# Patient Record
Sex: FEMALE | Race: WHITE | NOT HISPANIC OR LATINO | Employment: FULL TIME | URBAN - METROPOLITAN AREA
[De-identification: names, ages, dates, MRNs, and addresses within clinical notes are randomized per-mention and may not be internally consistent; named-entity substitution may affect disease eponyms.]

---

## 2019-02-04 ENCOUNTER — HOSPITAL ENCOUNTER (OUTPATIENT)
Dept: PHYSICAL THERAPY | Facility: HOSPITAL | Age: 39
Setting detail: THERAPIES SERIES
Discharge: HOME | End: 2019-02-04

## 2019-02-04 DIAGNOSIS — G89.29 CHRONIC BILATERAL LOW BACK PAIN WITHOUT SCIATICA: Primary | ICD-10-CM

## 2019-02-04 DIAGNOSIS — M54.50 CHRONIC BILATERAL LOW BACK PAIN WITHOUT SCIATICA: Primary | ICD-10-CM

## 2019-02-04 PROCEDURE — 97110 THERAPEUTIC EXERCISES: CPT

## 2019-02-04 PROCEDURE — 97140 MANUAL THERAPY 1/> REGIONS: CPT

## 2019-02-04 PROCEDURE — 97161 PT EVAL LOW COMPLEX 20 MIN: CPT

## 2019-02-04 PROCEDURE — 97035 APP MDLTY 1+ULTRASOUND EA 15: CPT

## 2019-02-04 NOTE — THERAPY EVALUATION
Outpatient Physical Therapy Ortho Initial Evaluation   [place]     Patient Name: Dank URIBE   : 1980  MRN: [id number]  Today's Date: 2019      Visit Date: 2019    There is no problem list on file for this patient.       No past medical history on file.     No past surgical history on file.    Visit Dx:     ICD-10-CM ICD-9-CM   1. Chronic bilateral low back pain without sciatica M54.5 724.2    G89.29 338.29       Patient History     Row Name 19 1000             History    Chief Complaint  Difficulty Walking;Difficulty with daily activities;Muscle tenderness;Pain  -CC      Type of Pain  Back pain  -CC      Date Current Problem(s) Began  19  -      Brief Description of Current Complaint  Pt reports a chronic hx of LBP for 13 years but pt had an injury aprox 18 which flared back and L hip sx. Pt is a chronic sleep walker and was staying at a friend and went out a window and fell 3 feet. 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  Dr Terri Garrett  -      How has patient tried to help current problem?  use of biofreeze and Tylenol  -CC      What clinical tests have you had for this problem?  -- none- scheduled for MRI 19  -         Pain     Pain Location  Back;Hip  -CC      Pain at Present  3  -CC      Pain at Best  3  -CC      Pain at Worst  4  -CC      Pain Frequency  Constant/continuous  -CC      Pain Description  Aching  -CC      What Performance Factors Make the Current Problem(s) WORSE?   bending-getting out of bed in AM  -CC      What Performance Factors Make the Current Problem(s) BETTER?  avoiding lifting -bending  -CC      Tolerance Time- Standing  15-30 min  -CC      Tolerance Time- Sitting  60 min  -CC      Tolerance Time- Walking  60 min-feel best walking  -CC      Is your sleep disturbed?  -- has difficulty getting comfortable but sleeps thru nite  -CC      Is medication used to assist with sleep?  -- Tylenol  -CC      What position do you sleep in?  Right sidelying  -CC      Difficulties at work?  yes with lifting with patient care  -CC      Difficulties with ADL's?  vacuuming and mopping-standing to fold laundry  -CC      Difficulties with recreational activities?  has not participated since injury  -CC         Fall Risk Assessment    Any falls in the past year:  Yes  -CC      Number of falls reported in the last 12 months  1  -CC      Factors that contributed to the fall:  -- sleep walking  -CC         Daily Activities    Primary Language  English  -CC      Are you able to read  Yes  -CC      Are you able to write  Yes  -CC      How does patient learn best?  Listening  -CC      Teaching needs identified  Home Exercise Program  -CC      Patient is concerned about/has problems with  Performing home management (household chores, shopping, care of dependents);Performing job responsibilities/community activities (work, school,;Performing sports, recreation, and play activities  -CC      Does patient have problems with the following?  Depression;Anxiety  -CC      Barriers to learning  None  -CC      Pt Participated in POC and Goals  Yes  -CC         Safety    Are you being hurt, hit, or frightened by anyone at home or in your life?  No  -CC      Are you being neglected by a caregiver  No  -CC        User Key  (r) = Recorded By, (t) = Taken By, (c) = Cosigned By    Initials Name Provider Type    CC Jenn Banda, PT Physical Therapist          PT Ortho     Row Name 02/04/19 1000        Subjective Comments    Subjective Comments  Pt states she continues to do what she has to do and then takes Tylenol and/or biofreeze. Pt is scheduled for a sleep study due to consequence of recent injury with sleep walking. -CC       Precautions and Contraindications    Precautions/Limitations  no known precautions/limitations  -CC       Posture/Observations    Scoliosis  Mild  -CC    Lumbar lordosis  Moderate;Decreased  -CC    Iliac crests  Left:;Mild;Elevated  -CC       Neural Tension Signs- Lower Quarter Clearing    Slump  Bilateral:;Negative  -CC    Well Slump  Bilateral:;Negative  -CC    SLR  Bilateral:;Negative  -CC       Lumbar ROM Screen- Lower Quarter Clearing    Lumbar Flexion  Impaired 50 % with sx  -CC    Lumbar Extension  Impaired 80 % with sx  -CC    Lumbar Lateral Flexion  Impaired SBL 50 %/ SBR 75 % w/ sx  -CC    Lumbar Rotation  Impaired 50% with sx  -CC       Lumbar/SI Special Tests    Standing Flexion Test (SI Dysfunction)  Left:;Positive  -CC    CHANDLER (hip vs. SI Dysfunction)  Bilateral:;Negative  -CC       Lumbosacral Palpation    SI  Left:;Tender  -CC    Lumbosacral Segment  Bilateral:;Tender  -CC    Spinous Process  -- negative  -CC    Piriformis  Left:;Tender  -CC    Erector Spinae (Paraspinals)  Left:;Tender  -CC    Greater Tuberosity  Left:;Tender  -CC       Hip Special Tests    Loretta’s test (tightness of ITB)  Left:;Positive  -CC       General ROM    GENERAL ROM COMMENTS  Grossly WNL B LE  -CC       MMT (Manual Muscle Testing)    General MMT Comments  Grossly B LE 5-/5 to 5/5  -CC       Sensation    Sensation WNL?  WNL  -CC       Lower Extremity Flexibility    Hamstrings  Right:;WNL L to 70% and mild sx back   -CC       Gait/Stairs Assessment/Training    Comment (Gait/Stairs)  No deficits in gait noted  -CC      User Key  (r) = Recorded By, (t) = Taken By, (c) = Cosigned By    Initials Name Provider Type    CC Jenn Banda, PT Physical Therapist                      Therapy  Education  Education Details: Issued HEP handout   Given: HEP  Program: New  How Provided: Verbal, Demonstration, Written  Provided to: Patient  Level of Understanding: Verbalized, Demonstrated     PT OP Goals     Row Name 02/04/19 1000          PT Short Term Goals    STG Date to Achieve  03/04/19  -CC     STG 1  Pt will perform HEP on daily basis as instructed  -CC     STG 2  Pt will report decrease sx 1-2/10 rangein L-S spine  -CC     STG 3  Pt will show improved spinal ROM in extension by 25-50%  -CC     STG 4  Pt reports decrease sx with vacuuming/mopping at home by 25-50%  -CC     STG 5  Pt will present with decrease soft tissue tenderness L SI jt and L greater trochanter by 25-50%  -CC       User Key  (r) = Recorded By, (t) = Taken By, (c) = Cosigned By    Initials Name Provider Type    CC Jenn Banda, PT Physical Therapist          PT Assessment/Plan     Row Name 02/04/19 1118          PT Assessment    Functional Limitations  Limitation in home management;Performance in work activities;Performance in leisure activities;Limitations in functional capacity and performance  -CC     Impairments  Impaired flexibility;Pain;Impaired postural alignment;Posture;Range of motion  -CC     Assessment Comments  Pt with 13 year history of chronic back pain but pt referred to PT secondary to a fall occuring the week before Christmas 2019 with persist flared sx. Pt presents with decrease spinal ROM greatest in extension -asymetry in pelvic and L sacral alignment and soft tissue dysfunction in  L L-S - L SI and L greater trochanter.  B LE strength normal and no neural signs identified.  Pt has not had any PT therapy since onset of sx 13 years ago.                                                                             -CC     Please refer to paper survey for additional self-reported information  Yes Back Index- score 44  -CC     Rehab Potential  Good  -CC     Patient/caregiver participated in establishment of  treatment plan and goals  Yes  -CC     Patient would benefit from skilled therapy intervention  Yes  -CC        PT Plan    PT Frequency  2x/week  -CC     Predicted Duration of Therapy Intervention (Therapy Eval)  4 weeks  -CC     Planned CPT's?  PT EVAL LOW COMPLEXITY: 59083;PT MANUAL THERAPY EA 15 MIN: 43907;PT HOT OR COLD PACK TREAT MCARE;PT ULTRASOUND EA 15 MIN: 82902;PT THER PROC EA 15 MIN: 23879;PT ELECTRICAL STIM UNATTEND: ;PT THER ACT EA 15 MIN: 31361  -CC     Physical Therapy Interventions (Optional Details)  taping;ROM (Range of Motion);strengthening;stretching;modalities;manual therapy techniques  -CC     PT Plan Comments  Per POC. Pt to have MRI at [facility] 2-11-19  -CC       User Key  (r) = Recorded By, (t) = Taken By, (c) = Cosigned By    Initials Name Provider Type    CC Jenn Banda, PT Physical Therapist          Modalities     Row Name 02/04/19 1000             Moist Heat    MH Applied  Yes  -CC      Location  L-S and L hip-pt sidelying  -CC      Rx Minutes  12 mins  -CC      MH S/P Rx  Yes  -CC         Ultrasound 44204    Location  L L-S/SI-pt sidelying R  -CC      Frequency  1.0 MHz  -CC      Intensity - Wts/cm  1.5  -CC      11396 - PT Ultrasound Minutes  8  -CC        User Key  (r) = Recorded By, (t) = Taken By, (c) = Cosigned By    Initials Name Provider Type    CC Jenn Banda, PT Physical Therapist        Exercises     Row Name 02/04/19 1000             Subjective Comments    Subjective Comments  Pt states she continues to do what she has to do and then takes Tylenol and/or biofreeze. Pt is scheduled for a sleep study due to sleep walking and most recent injury  -CC         Exercise 1    Exercise Name 1  B SKC  -CC      Cueing 1  Verbal;Tactile  -CC      Reps 1  x5  -CC      Time 1  10 sec  -CC         Exercise 2    Exercise Name 2  B LTR  -CC      Cueing 2  Verbal;Tactile  -CC      Reps 2  x5  -CC      Time 2  10 sec  -CC         Exercise 3     Exercise Name 3  B piriformis stretch  -CC      Cueing 3  Verbal;Tactile  -CC      Reps 3  x 3  -CC      Time 3  15 sec  -CC        User Key  (r) = Recorded By, (t) = Taken By, (c) = Cosigned By    Initials Name Provider Type    Jenn Salter, PT Physical Therapist           Manual Rx (last 36 hours)      Manual Treatments     Row Name 02/04/19 1000             Manual Rx 1    Manual Rx 1 Location  pelvis and sacrum  -CC      Manual Rx 1 Type  MET  -CC      Manual Rx 1 Grade  shotgun- Post rot L- LOL  -CC        User Key  (r) = Recorded By, (t) = Taken By, (c) = Cosigned By    Initials Name Provider Type    Jenn Salter, PT Physical Therapist                                Time Calculation:     Therapy Suggested Charges     Code   Minutes Charges    09143 (CPT®) Hc Pt Neuromusc Re Education Ea 15 Min      60861 (CPT®) Hc Pt Ther Proc Ea 15 Min      84068 (CPT®) Hc Gait Training Ea 15 Min      31421 (CPT®) Hc Pt Therapeutic Act Ea 15 Min      66119 (CPT®) Hc Pt Manual Therapy Ea 15 Min      16257 (CPT®) Hc Pt Ther Massage- Per 15 Min      10273 (CPT®) Hc Pt Iontophoresis Ea 15 Min      73349 (CPT®) Hc Pt Elec Stim Ea-Per 15 Min      56381 (CPT®) Hc Pt Ultrasound Ea 15 Min 8 1    04694 (CPT®) Hc Pt Self Care/Mgmt/Train Ea 15 Min      61385 (CPT®) Hc Pt Prosthetic (S) Train Initial Encounter, Each 15 Min      43114 (CPT®) Hc Orthotic(S) Mgmt/Train Initial Encounter, Each 15min      35841 (CPT®) Hc Pt Aquatic Therapy Ea 15 Min      10406 (CPT®) Hc Pt Orthotic(S)/Prosthetic(S) Encounter, Each 15 Min       (CPT®) Hc Pt Electrical Stim Unattended      Total  8 1          Start Time: 1000  Stop Time: 1115  Time Calculation (min): 75 min     Therapy Charges for Today     Code Description Service Date Service Provider Modifiers Qty    89079806514 HC PT EVAL LOW COMPLEXITY 2 2/4/2019 Jenn Banda, PT GP 1    88151684712 HC PT ULTRASOUND EA 15 MIN 2/4/2019 Jenn Badna,  PT GP 1    43414509791 HC PT THER PROC EA 15 MIN 2/4/2019 Jenn Banda, PT GP 1    45111642080 HC PT MANUAL THERAPY EA 15 MIN 2/4/2019 Jenn Banda, PT GP 1    49425579055 HC PT HOT OR COLD PACK TREAT MCARE 2/4/2019 Jenn Banda, PT GP 1                    Jenn Banda, PT  2/4/2019

## 2019-02-12 ENCOUNTER — HOSPITAL ENCOUNTER (OUTPATIENT)
Dept: PHYSICAL THERAPY | Facility: HOSPITAL | Age: 39
Setting detail: THERAPIES SERIES
Discharge: HOME | End: 2019-02-12

## 2019-02-12 DIAGNOSIS — G89.29 CHRONIC BILATERAL LOW BACK PAIN WITHOUT SCIATICA: Primary | ICD-10-CM

## 2019-02-12 DIAGNOSIS — M54.50 CHRONIC BILATERAL LOW BACK PAIN WITHOUT SCIATICA: Primary | ICD-10-CM

## 2019-02-12 PROCEDURE — 97110 THERAPEUTIC EXERCISES: CPT

## 2019-02-12 PROCEDURE — 97035 APP MDLTY 1+ULTRASOUND EA 15: CPT

## 2019-02-12 PROCEDURE — 97140 MANUAL THERAPY 1/> REGIONS: CPT

## 2019-02-12 NOTE — THERAPY TREATMENT NOTE
Outpatient Physical Therapy Ortho Treatment Note  [facility]     Patient Name: Dank URIBE   : 1980  MRN: [id number]  Today's Date: 2019      Visit Date: 2019    Visit Dx:    ICD-10-CM ICD-9-CM   1. Chronic bilateral low back pain without sciatica M54.5 724.2    G89.29 338.29       There is no problem list on file for this patient.       No past medical history on file.     No past surgical history on file.                    PT Assessment/Plan     Row Name 19 1629          PT Assessment    Assessment Comments  Pt reporting decrease intensity of back pain sx -also improving with alignment. Progressed exercises including lumbar stabilization.  -CC       User Key  (r) = Recorded By, (t) = Taken By, (c) = Cosigned By    Initials Name Provider Type    Jenn Salter, PT Physical Therapist          Modalities     Row Name 19 1100             Subjective Comments    Subjective Comments  Pt states she was sore the day of therapy and had a muscle spasm L lateral lumbar -pt had a friend massage it and felt better. Pt reports no sx in lumbar -relates pain L SI region  -CC         Subjective Pain    Pre-Treatment Pain Level  2  -CC      Post-Treatment Pain Level  1  -CC         Moist Heat    Location  L-S and pt sidelying  -CC      Rx Minutes  12 mins  -CC      MH S/P Rx  Yes  -CC         Ultrasound 20608    Location  L L-S/SI-pt sidelying R  -CC      Frequency  1.0 MHz  -CC      Intensity - Wts/cm  1.5  -CC      10103 - PT Ultrasound Minutes  8  -CC        User Key  (r) = Recorded By, (t) = Taken By, (c) = Cosigned By    Initials Name Provider Type    Jenn Salter, PT Physical Therapist          Exercises     Row Name 19 1100             Subjective Comments    Subjective Comments  Pt states she was sore the day of therapy and had a muscle spasm L lateral lumbar -pt had a friend massage it and felt better. Pt reports no sx in lumbar -relates pain L SI  region  -CC         Subjective Pain    Pre-Treatment Pain Level  2  -CC      Post-Treatment Pain Level  1  -CC         Exercise 1    Exercise Name 1  B DKC  -CC      Cueing 1  Verbal;Tactile  -CC      Reps 1  x5  -CC      Time 1  10 sec  -CC         Exercise 2    Exercise Name 2  B LTR  -CC      Cueing 2  Verbal;Tactile  -CC      Reps 2  x5  -CC      Time 2  10 sec  -CC         Exercise 3    Exercise Name 3  B piriformis stretch  -CC      Cueing 3  Verbal;Tactile  -CC      Reps 3  x 3  -CC      Time 3  15 sec  -CC         Exercise 4    Exercise Name 4  Prone on elbows  -CC      Cueing 4  Verbal;Tactile  -CC      Reps 4  x10-over 1 pillow  -CC         Exercise 5    Exercise Name 5  B PLR  -CC      Cueing 5  Verbal;Tactile  -CC      Reps 5  x5 -alternate legs- perform abs stability each lift  -CC         Exercise 6    Exercise Name 6  Lower AB march with PPT  -CC      Cueing 6  Verbal;Tactile  -CC      Reps 6  x5 -alternate  -CC        User Key  (r) = Recorded By, (t) = Taken By, (c) = Cosigned By    Initials Name Provider Type    Jenn Salter, PT Physical Therapist                        Manual Rx (last 36 hours)      Manual Treatments     Row Name 02/12/19 1100             Manual Rx 1    Manual Rx 1 Location  pelvis and sacrum  -CC      Manual Rx 1 Type  MET  -CC      Manual Rx 1 Grade  shotgun- Post rot L- ROL-FSRL L 5  -CC        User Key  (r) = Recorded By, (t) = Taken By, (c) = Cosigned By    Initials Name Provider Type    Jenn Salter, PT Physical Therapist          PT OP Goals     Row Name 02/12/19 1100          PT Short Term Goals    STG Date to Achieve  03/04/19  -CC     STG 1  Pt will perform HEP on daily basis as instructed  -CC     STG 2  Pt will report decrease sx 1-2/10 rangein L-S spine  -CC     STG 3  Pt will show improved spinal ROM in extension by 25-50%  -CC     STG 4  Pt reports decrease sx with vacuuming/mopping at home by 25-50%  -CC     STG 5  Pt will present with  decrease soft tissue tenderness L SI jt and L greater trochanter by 25-50%  -CANDE       User Key  (r) = Recorded By, (t) = Taken By, (c) = Cosigned By    Initials Name Provider Type    Jenn Salter, PT Physical Therapist          Therapy Education  Education Details: Issued handout for added exercises HEP  Given: HEP  Program: New, Reinforced, Progressed  How Provided: Verbal, Written  Provided to: Patient  Level of Understanding: Verbalized, Demonstrated              Time Calculation:   Start Time: 1100  Stop Time: 1200  Time Calculation (min): 60 min  Therapy Suggested Charges     Code   Minutes Charges    55313 (CPT®) Hc Pt Neuromusc Re Education Ea 15 Min      19306 (CPT®) Hc Pt Ther Proc Ea 15 Min      73354 (CPT®) Hc Gait Training Ea 15 Min      37433 (CPT®) Hc Pt Therapeutic Act Ea 15 Min      96262 (CPT®) Hc Pt Manual Therapy Ea 15 Min      12444 (CPT®) Hc Pt Ther Massage- Per 15 Min      85510 (CPT®) Hc Pt Iontophoresis Ea 15 Min      72311 (CPT®) Hc Pt Elec Stim Ea-Per 15 Min      40784 (CPT®) Hc Pt Ultrasound Ea 15 Min 8 1    84842 (CPT®) Hc Pt Self Care/Mgmt/Train Ea 15 Min      35975 (CPT®) Hc Pt Prosthetic (S) Train Initial Encounter, Each 15 Min      71292 (CPT®) Hc Orthotic(S) Mgmt/Train Initial Encounter, Each 15min      74073 (CPT®) Hc Pt Aquatic Therapy Ea 15 Min      46304 (CPT®) Hc Pt Orthotic(S)/Prosthetic(S) Encounter, Each 15 Min       (CPT®) Hc Pt Electrical Stim Unattended      Total  8 1        Therapy Charges for Today     Code Description Service Date Service Provider Modifiers Qty    94962661271 HC PT MANUAL THERAPY EA 15 MIN 2/12/2019 Jenn Banda, PT GP 1    42245326919 HC PT ULTRASOUND EA 15 MIN 2/12/2019 Jenn Banda, PT GP 1    01085889289 HC PT THER PROC EA 15 MIN 2/12/2019 Jenn Banda, PT GP 1    04253027050 HC PT HOT OR COLD PACK TREAT MCARE 2/12/2019 Jenn Banda, PT GP 1                    Jenn MIDDLETON  Solo, PT  2/12/2019

## 2019-02-19 ENCOUNTER — OFFICE VISIT (OUTPATIENT)
Dept: SLEEP MEDICINE | Facility: HOSPITAL | Age: 39
End: 2019-02-19
Attending: INTERNAL MEDICINE

## 2019-02-19 VITALS
SYSTOLIC BLOOD PRESSURE: 126 MMHG | DIASTOLIC BLOOD PRESSURE: 80 MMHG | HEART RATE: 102 BPM | HEIGHT: 69 IN | WEIGHT: 192 LBS | BODY MASS INDEX: 28.44 KG/M2

## 2019-02-19 DIAGNOSIS — G47.33 OBSTRUCTIVE SLEEP APNEA, ADULT: Primary | ICD-10-CM

## 2019-02-19 DIAGNOSIS — G47.52 REM SLEEP BEHAVIOR DISORDER: ICD-10-CM

## 2019-02-19 DIAGNOSIS — I10 ESSENTIAL HYPERTENSION: ICD-10-CM

## 2019-02-19 PROCEDURE — G0463 HOSPITAL OUTPT CLINIC VISIT: HCPCS

## 2019-02-19 NOTE — PROGRESS NOTES
PULMONARY SLEEP CONSULT NOTE      PATIENT IDENTIFICATION:  Name: Dank URIBE   Age: 38 y.o.  Sex: female  :  1980  MRN: [id number]    DATE OF CONSULTATION:  2019   Referring Physician: No admitting provider for patient encounter.                  CHIEF COMPLAINT: Obstructive Sleep Apnea    History of Present Illness:   Dank URIBE  is a 38 y.o. female Pt with still multiple wakening up at night with sleepiness fatigue and snoring, witnessed apnea, Hard  to get up in the morning. Daytime fatigue sleepiness loss of energy, [place] score of ( 16)   Also sleep walking recently    Review of Systems:   Constitutional: negative   Eyes: negative   ENT/oropharynx: negative   Cardiovascular: negative   Respiratory: negative   Gastrointestinal: negative   Genitourinary: negative   Neurological: negative   Musculoskeletal: negative   Integument/breast: negative   Endocrine: negative   Allergic/Immunologic: negative     Past Medical History:  No past medical history on file.    Past Surgical History:  No past surgical history on file.     Family History:  No family history on file.     Social History:   Social History     Tobacco Use   • Smoking status: Not on file   Substance Use Topics   • Alcohol use: Not on file        Allergies:  Allergies not on file    Home Meds:    (Not in a hospital admission)    Objective:    Vitals Ranges:   Heart Rate:  [102] 102  BP: (126)/(80) 126/80  Body mass index is 28.35 kg/m².     Exam:  General Appearance:  WDWN    HEENT:   without obvious abnormality,  Conjunctiva/corneas clear,  Normal external ear canals, no drainage    Clear orsalmucosa,  Mallampati score 3    Neck:  Supple, symmetrical, trachea midline. No JVD.  Lungs:   Bilateral basal rhonchi bilaterally, respirations unlabored symmetrical wall movement.    Chest wall:  No tenderness or deformity.    Heart:  Regular rate and rhythm, S1 and S2 normal.  Extremities: Trace edema no clubbing or Cyanosis        Data  Review:  All labs (24hrs): No results found for this or any previous visit (from the past 24 hour(s)).     Imaging:  [unfilled]    ASSESSMENT:  Diagnoses and all orders for this visit:    Obstructive sleep apnea, adult    REM sleep behavior disorder    Essential hypertension        PLAN:   This is patient with symptoms of obstructive sleep apnea, NPSG study ASAP / split night study, Avoid supine avoid sedative meds in pm, weight loss, Avoid driving. Long discussion with patient about the physiology of SANAZ, and long term and short term   benefit of treating SANAZ     Safety measurement for sleep walking     Zakiya Molina MD. D, ABSM.  2/19/2019  2:42 PM

## 2019-03-04 ENCOUNTER — HOSPITAL ENCOUNTER (OUTPATIENT)
Dept: SLEEP MEDICINE | Facility: HOSPITAL | Age: 39
Discharge: HOME | End: 2019-03-04
Admitting: INTERNAL MEDICINE

## 2019-03-04 DIAGNOSIS — G47.33 OBSTRUCTIVE SLEEP APNEA, ADULT: ICD-10-CM

## 2019-03-04 PROCEDURE — 95810 POLYSOM 6/> YRS 4/> PARAM: CPT

## 2019-03-06 ENCOUNTER — TELEPHONE (OUTPATIENT)
Dept: SLEEP MEDICINE | Facility: HOSPITAL | Age: 39
End: 2019-03-06

## 2019-03-29 ENCOUNTER — DOCUMENTATION (OUTPATIENT)
Dept: PHYSICAL THERAPY | Facility: HOSPITAL | Age: 39
End: 2019-03-29

## 2019-03-29 DIAGNOSIS — G89.29 CHRONIC BILATERAL LOW BACK PAIN WITHOUT SCIATICA: Primary | ICD-10-CM

## 2019-03-29 DIAGNOSIS — M54.50 CHRONIC BILATERAL LOW BACK PAIN WITHOUT SCIATICA: Primary | ICD-10-CM

## 2019-04-16 ENCOUNTER — OFFICE VISIT (OUTPATIENT)
Dept: SLEEP MEDICINE | Facility: HOSPITAL | Age: 39
End: 2019-04-16

## 2019-04-16 VITALS
WEIGHT: 193 LBS | BODY MASS INDEX: 28.58 KG/M2 | SYSTOLIC BLOOD PRESSURE: 125 MMHG | HEIGHT: 69 IN | HEART RATE: 93 BPM | DIASTOLIC BLOOD PRESSURE: 76 MMHG

## 2019-04-16 DIAGNOSIS — I10 ESSENTIAL HYPERTENSION: ICD-10-CM

## 2019-04-16 DIAGNOSIS — G47.61 PERIODIC LIMB MOVEMENT DISORDER: ICD-10-CM

## 2019-04-16 DIAGNOSIS — R06.83 SNORING: Primary | ICD-10-CM

## 2019-04-16 PROCEDURE — G0463 HOSPITAL OUTPT CLINIC VISIT: HCPCS

## 2019-04-16 NOTE — PROGRESS NOTES
PULMONARY SLEEP CONSULT NOTE      PATIENT IDENTIFICATION:  Name: Dank URIBE   Age: 38 y.o.  Sex: female  :  1980  MRN: [id number]    DATE OF CONSULTATION:  2019   Referring Physician: No admitting provider for patient encounter.                  CHIEF COMPLAINT: Obstructive Sleep Apnea    History of Present Illness:   Dank URIBE  is a 38 y.o. female  had sleep study done on (3/4/2019 )  primary snoring but is showed PLMD, sleep result discussed in details with patient.       Review of Systems:   Constitutional: negative   Eyes: negative   ENT/oropharynx: negative   Cardiovascular: negative   Respiratory: negative   Gastrointestinal: negative   Genitourinary: negative   Neurological: negative   Musculoskeletal: negative   Integument/breast: negative   Endocrine: negative   Allergic/Immunologic: negative     Past Medical History:  No past medical history on file.    Past Surgical History:  No past surgical history on file.     Family History:  No family history on file.     Social History:   Social History     Tobacco Use   • Smoking status: Not on file   Substance Use Topics   • Alcohol use: Not on file        Allergies:  Allergies not on file    Home Meds:    (Not in a hospital admission)    Objective:    Vitals Ranges:   Heart Rate:  [93] 93  BP: (125)/(76) 125/76  Body mass index is 28.5 kg/m².     Exam:  General Appearance:  WDWN    HEENT:   without obvious abnormality,  Conjunctiva/corneas clear,  Normal external ear canals, no drainage    Clear orsalmucosa,  Mallampati score 3    Neck:  Supple, symmetrical, trachea midline. No JVD.  Lungs:   Bilateral basal rhonchi bilaterally, respirations unlabored symmetrical wall movement.    Chest wall:  No tenderness or deformity.    Heart:  Regular rate and rhythm, S1 and S2 normal.  Extremities: Trace edema no clubbing or Cyanosis        Data Review:  All labs (24hrs): No results found for this or any previous visit (from the past 24  hour(s)).     Imaging:  [unfilled]    ASSESSMENT:  Diagnoses and all orders for this visit:    Snoring    Periodic limb movement disorder    Essential hypertension        PLAN:   This is patient with snoring  Avoid supine avoid sedative meds in pm, weight loss, Avoid driving. Long discussion with patient about the physiology of SANAZ, and long term and short term   benefit of treating SANAZ     This patient with PLMD try mirapex at night and f/u as out pt 0.5 up to 1 mg if needed  Treating SANAZ will improve BP control         Zakiya Molina MD. D, ABSM.  4/16/2019  2:34 PM

## 2019-05-28 ENCOUNTER — OFFICE VISIT (OUTPATIENT)
Dept: SLEEP MEDICINE | Facility: HOSPITAL | Age: 39
End: 2019-05-28

## 2019-05-28 VITALS
BODY MASS INDEX: 27.85 KG/M2 | WEIGHT: 188 LBS | SYSTOLIC BLOOD PRESSURE: 143 MMHG | HEIGHT: 69 IN | DIASTOLIC BLOOD PRESSURE: 81 MMHG

## 2019-05-28 DIAGNOSIS — G47.52 REM SLEEP BEHAVIOR DISORDER: Primary | ICD-10-CM

## 2019-05-28 DIAGNOSIS — G47.61 PERIODIC LIMB MOVEMENT DISORDER: ICD-10-CM

## 2019-05-28 PROCEDURE — G0463 HOSPITAL OUTPT CLINIC VISIT: HCPCS

## 2019-05-28 NOTE — PROGRESS NOTES
PULMONARY SLEEP CONSULT NOTE      PATIENT IDENTIFICATION:  Name: Dank URIBE   Age: 38 y.o.  Sex: female  :  1980  MRN: [id number]    DATE OF CONSULTATION:  2019   Referring Physician: No admitting provider for patient encounter.                  CHIEF COMPLAINT: Obstructive Sleep Apnea    History of Present Illness:   Dank URIBE  is a 38 y.o. female pt on treatment for PLMD with mirapex feeling sleepy even with 0.5 mg  But able to  keep less moving legs and wakening up at night, and still  tiredness fatigue.      Review of Systems:   Constitutional: negative   Eyes: negative   ENT/oropharynx: negative   Cardiovascular: negative   Respiratory: negative   Gastrointestinal: negative   Genitourinary: negative   Neurological: negative   Musculoskeletal: negative   Integument/breast: negative   Endocrine: negative   Allergic/Immunologic: negative     Past Medical History:  No past medical history on file.    Past Surgical History:  No past surgical history on file.     Family History:  No family history on file.     Social History:   Social History     Tobacco Use   • Smoking status: Not on file   Substance Use Topics   • Alcohol use: Not on file        Allergies:  Allergies not on file    Home Meds:    (Not in a hospital admission)    Objective:    Vitals Ranges:   BP: (143)/(81) 143/81  Body mass index is 27.76 kg/m².     Exam:  General Appearance:  WDWN    HEENT:   without obvious abnormality,  Conjunctiva/corneas clear,  Normal external ear canals, no drainage    Clear orsalmucosa,  Mallampati score 3    Neck:  Supple, symmetrical, trachea midline. No JVD.  Lungs:   Bilateral basal rhonchi bilaterally, respirations unlabored symmetrical wall movement.    Chest wall:  No tenderness or deformity.    Heart:  Regular rate and rhythm, S1 and S2 normal.  Extremities: Trace edema no clubbing or Cyanosis        Data Review:  All labs (24hrs): No results found for this or any previous visit (from the  past 24 hour(s)).     Imaging:  [unfilled]    ASSESSMENT:  Diagnoses and all orders for this visit:    REM sleep behavior disorder    Periodic limb movement disorder        PLAN:  Will try gabapentin 100 mg  at night can increase to 200 mg which less daytime sleepiness, and will help to improve REM sleep      Zakiya Molina MD. D, ABSM.  5/28/2019  10:57 AM

## 2019-07-09 ENCOUNTER — OFFICE VISIT (OUTPATIENT)
Dept: SLEEP MEDICINE | Facility: HOSPITAL | Age: 39
End: 2019-07-09

## 2019-07-09 VITALS
HEIGHT: 69 IN | SYSTOLIC BLOOD PRESSURE: 127 MMHG | WEIGHT: 188 LBS | HEART RATE: 82 BPM | BODY MASS INDEX: 27.85 KG/M2 | DIASTOLIC BLOOD PRESSURE: 74 MMHG

## 2019-07-09 DIAGNOSIS — G47.52 REM SLEEP BEHAVIOR DISORDER: ICD-10-CM

## 2019-07-09 DIAGNOSIS — I10 ESSENTIAL HYPERTENSION: ICD-10-CM

## 2019-07-09 DIAGNOSIS — G47.61 PERIODIC LIMB MOVEMENT DISORDER: Primary | ICD-10-CM

## 2019-07-09 PROCEDURE — G0463 HOSPITAL OUTPT CLINIC VISIT: HCPCS

## 2019-07-09 NOTE — PROGRESS NOTES
PULMONARY SLEEP CONSULT NOTE      PATIENT IDENTIFICATION:  Name: Dank URIBE   Age: 38 y.o.  Sex: female  :  1980  MRN: [id number]    DATE OF CONSULTATION:  2019   Referring Physician: No admitting provider for patient encounter.                  CHIEF COMPLAINT: Obstructive Sleep Apnea    History of Present Illness:   Dank URIBE  is a 38 y.o. female pt tried Gabapentin 2 00 did not help she is back to mirapex and working good except hard to wake up in am only but the rest of the day feeling better full off energy      Review of Systems:   Constitutional: As above    Eyes: negative   ENT/oropharynx: negative   Cardiovascular: negative   Respiratory: negative   Gastrointestinal: negative   Genitourinary: negative   Neurological: negative   Musculoskeletal: negative   Integument/breast: negative   Endocrine: negative   Allergic/Immunologic: negative     Past Medical History:  No past medical history on file.    Past Surgical History:  No past surgical history on file.     Family History:  No family history on file.     Social History:   Social History     Tobacco Use   • Smoking status: Not on file   Substance Use Topics   • Alcohol use: Not on file        Allergies:  Allergies not on file    Home Meds:    (Not in a hospital admission)    Objective:    Vitals Ranges:   Heart Rate:  [82] 82  BP: (127)/(74) 127/74  Body mass index is 27.76 kg/m².     Exam:  General Appearance:  WDWN    HEENT:   without obvious abnormality,  Conjunctiva/corneas clear,  Normal external ear canals, no drainage    Clear orsalmucosa,  Mallampati score 3    Neck:  Supple, symmetrical, trachea midline. No JVD.  Lungs:   Bilateral basal rhonchi bilaterally, respirations unlabored symmetrical wall movement.    Chest wall:  No tenderness or deformity.    Heart:  Regular rate and rhythm, S1 and S2 normal.  Extremities: Trace edema no clubbing or Cyanosis        Data Review:  All labs (24hrs): No results found for this or any  previous visit (from the past 24 hour(s)).     Imaging:  [unfilled]    ASSESSMENT:  Diagnoses and all orders for this visit:    Periodic limb movement disorder    REM sleep behavior disorder    Essential hypertension        PLAN:  Feeling better on Mirapex  1 mg  continue monitor for development of anemia or renal failure  Can increase to 2 mg if needed, also to take it 3-4 hour before bed time   Still sleep talking but it is not affecting her social life    Zakiya Molina MD. D, ABSM.  7/9/2019  2:39 PM

## 2021-06-11 ENCOUNTER — TRANSCRIBE ORDERS (OUTPATIENT)
Dept: ADMINISTRATIVE | Facility: HOSPITAL | Age: 41
End: 2021-06-11

## 2021-06-11 DIAGNOSIS — M25.562 ACUTE PAIN OF LEFT KNEE: Primary | ICD-10-CM

## 2021-06-29 ENCOUNTER — HOSPITAL ENCOUNTER (OUTPATIENT)
Dept: MRI IMAGING | Facility: HOSPITAL | Age: 41
Discharge: HOME | End: 2021-06-29
Admitting: FAMILY MEDICINE

## 2021-06-29 DIAGNOSIS — M25.562 ACUTE PAIN OF LEFT KNEE: ICD-10-CM

## 2021-06-29 PROCEDURE — 73721 MRI JNT OF LWR EXTRE W/O DYE: CPT

## 2022-07-08 ENCOUNTER — PATIENT ROUNDING (BHMG ONLY) (OUTPATIENT)
Dept: FAMILY MEDICINE CLINIC | Facility: CLINIC | Age: 42
End: 2022-07-08

## 2022-07-08 ENCOUNTER — OFFICE VISIT (OUTPATIENT)
Dept: FAMILY MEDICINE CLINIC | Facility: CLINIC | Age: 42
End: 2022-07-08

## 2022-07-08 VITALS
WEIGHT: 214 LBS | TEMPERATURE: 98.7 F | HEIGHT: 69 IN | OXYGEN SATURATION: 98 % | HEART RATE: 76 BPM | BODY MASS INDEX: 31.7 KG/M2 | DIASTOLIC BLOOD PRESSURE: 91 MMHG | SYSTOLIC BLOOD PRESSURE: 134 MMHG

## 2022-07-08 DIAGNOSIS — Z00.00 ANNUAL PHYSICAL EXAM: Primary | ICD-10-CM

## 2022-07-08 DIAGNOSIS — Z13.1 SCREENING FOR DIABETES MELLITUS: ICD-10-CM

## 2022-07-08 DIAGNOSIS — Z87.19 HISTORY OF HIATAL HERNIA: ICD-10-CM

## 2022-07-08 DIAGNOSIS — Z71.6 ENCOUNTER FOR SMOKING CESSATION COUNSELING: ICD-10-CM

## 2022-07-08 DIAGNOSIS — Z13.29 SCREENING FOR THYROID DISORDER: ICD-10-CM

## 2022-07-08 DIAGNOSIS — Z76.89 ENCOUNTER TO ESTABLISH CARE: ICD-10-CM

## 2022-07-08 DIAGNOSIS — E78.2 MIXED HYPERLIPIDEMIA: ICD-10-CM

## 2022-07-08 DIAGNOSIS — K21.9 GASTROESOPHAGEAL REFLUX DISEASE, UNSPECIFIED WHETHER ESOPHAGITIS PRESENT: ICD-10-CM

## 2022-07-08 PROBLEM — G47.33 OBSTRUCTIVE SLEEP APNEA SYNDROME: Status: ACTIVE | Noted: 2022-07-08

## 2022-07-08 LAB — HBA1C MFR BLD: 5.1 % (ref 3.5–5.6)

## 2022-07-08 PROCEDURE — 83036 HEMOGLOBIN GLYCOSYLATED A1C: CPT | Performed by: REGISTERED NURSE

## 2022-07-08 PROCEDURE — 99204 OFFICE O/P NEW MOD 45 MIN: CPT | Performed by: REGISTERED NURSE

## 2022-07-08 PROCEDURE — 80050 GENERAL HEALTH PANEL: CPT | Performed by: REGISTERED NURSE

## 2022-07-08 PROCEDURE — 80061 LIPID PANEL: CPT | Performed by: REGISTERED NURSE

## 2022-07-08 RX ORDER — ATORVASTATIN CALCIUM 10 MG/1
10 TABLET, FILM COATED ORAL DAILY
Qty: 90 TABLET | Refills: 3 | Status: SHIPPED | OUTPATIENT
Start: 2022-07-08

## 2022-07-08 RX ORDER — OMEPRAZOLE 20 MG/1
20 CAPSULE, DELAYED RELEASE ORAL DAILY
COMMUNITY
End: 2022-07-08 | Stop reason: SDUPTHER

## 2022-07-08 RX ORDER — OMEPRAZOLE 40 MG/1
40 CAPSULE, DELAYED RELEASE ORAL DAILY
Qty: 90 CAPSULE | Refills: 1 | Status: SHIPPED | OUTPATIENT
Start: 2022-07-08 | End: 2023-02-17

## 2022-07-08 RX ORDER — OMEPRAZOLE 20 MG/1
20 CAPSULE, DELAYED RELEASE ORAL DAILY
Qty: 90 CAPSULE | Refills: 1 | Status: SHIPPED | OUTPATIENT
Start: 2022-07-08 | End: 2022-07-08

## 2022-07-08 RX ORDER — BUPROPION HYDROCHLORIDE 450 MG/1
450 TABLET, FILM COATED, EXTENDED RELEASE ORAL DAILY
COMMUNITY

## 2022-07-08 RX ORDER — POLYETHYLENE GLYCOL 3350 17 G
4 POWDER IN PACKET (EA) ORAL AS NEEDED
Qty: 108 EACH | Refills: 3 | Status: SHIPPED | OUTPATIENT
Start: 2022-07-08 | End: 2023-03-28

## 2022-07-08 RX ORDER — ATORVASTATIN CALCIUM 10 MG/1
10 TABLET, FILM COATED ORAL DAILY
COMMUNITY
End: 2022-07-08 | Stop reason: SDUPTHER

## 2022-07-08 NOTE — PROGRESS NOTES
July 8, 2022    Hello, may I speak with Dank Cevallos?    My name is Cari    I am  with [facility]  [address] [place] [address].    Before we get started may I verify your date of birth? 1980    I am calling to officially welcome you to our practice and ask about your recent visit. Is this a good time to talk? yes    Tell me about your visit with us. What things went well?  patients visit with Volodymyr went really good!       We're always looking for ways to make our patients' experiences even better. Do you have recommendations on ways we may improve?  yes    Overall were you satisfied with your first visit to our practice? yes       I appreciate you taking the time to speak with me today. Is there anything else I can do for you? no      Thank you, and have a great day.

## 2022-07-08 NOTE — PROGRESS NOTES
Chief Complaint  Establish Care    Subjective    History of Present Illness {CC  Problem List  Visit  Diagnosis   Encounters  Notes  Medications  Labs  Result Review Imaging  Media :23}     Dank Cevallos presents to [facility] for Establish Care.    Is a 41-year-old female who presents to the clinic today to establish care, with concerns of chronic GERD and smoking cessation.  Patient denies any chest pain, shortness of breath, or any fevers.  Patient denies any known exposure to COVID, flu, or any other contagious illnesses.    Regarding GERD, patient shares that she has been on omeprazole for several years.  Patient shares that she has a hiatal hernia.  She shares that she had an upper endoscopy for this approximately 12 years ago.  She shares that she has been taking omeprazole prior to this at the 20 mg dose.  We discussed increasing this dose.  Patient has requested GI referral today to establish and discuss having another upper endoscopy to evaluate any changes with her hiatal hernia.    Regarding smoking cessation, patient shares that she is actively trying to quit smoking.  She shares that she has been using the nicotine lozenges.  She would like to know if a prescription can be sent over to help cover these.  I have advised patient I be happy to send over information for the [place] quit now program.  Patient has no other concerns or questions about this at this time.         Review of Systems   Constitutional: Negative.  Negative for activity change, chills, fatigue and fever.   HENT: Negative.  Negative for congestion, dental problem, ear pain, hearing loss, rhinorrhea, sinus pain, sore throat, tinnitus and trouble swallowing.    Eyes: Negative.  Negative for pain and visual disturbance.   Respiratory: Negative.  Negative for cough, chest tightness, shortness of breath and wheezing.    Cardiovascular: Negative.  Negative for chest pain, palpitations and leg  "swelling.   Gastrointestinal: Negative.  Negative for abdominal pain, diarrhea, nausea and vomiting.        Heart burn   Endocrine: Negative.  Negative for polydipsia, polyphagia and polyuria.   Genitourinary: Negative.  Negative for difficulty urinating, dysuria, frequency and urgency.   Musculoskeletal: Negative.  Negative for arthralgias, back pain and myalgias.   Skin: Negative.  Negative for color change, pallor, rash and wound.   Allergic/Immunologic: Negative.  Negative for environmental allergies.   Neurological: Negative.  Negative for dizziness, speech difficulty, weakness, light-headedness, numbness and headaches.   Hematological: Negative.    Psychiatric/Behavioral: Negative.  Negative for confusion, decreased concentration, self-injury and suicidal ideas. The patient is not nervous/anxious.    All other systems reviewed and are negative.       Objective     Vital Signs:   /91 (BP Location: Right arm, Patient Position: Sitting, Cuff Size: Adult)   Pulse 76   Temp 98.7 °F (37.1 °C) (Temporal)   Ht 175.3 cm (69\")   Wt 97.1 kg (214 lb)   SpO2 98%   BMI 31.60 kg/m²     Past Medical History:   Diagnosis Date   • Anxiety    • Colon polyp    • Depression    • GERD (gastroesophageal reflux disease)    • Headache    • Heart murmur    • Hyperlipidemia    • Hypertension    • Irritable bowel syndrome    • Low back pain       Past Surgical History:   Procedure Laterality Date   • COLONOSCOPY     • ENDOMETRIAL ABLATION     • SUBTOTAL HYSTERECTOMY        Family History   Problem Relation Age of Onset   • Bipolar disorder Mother    • No Known Problems Father    • Irritable bowel syndrome Sister    • Fibromyalgia Sister    • No Known Problems Brother    • Hypertension Daughter       Social History     Socioeconomic History   • Marital status: Single   Tobacco Use   • Smoking status: Light Tobacco Smoker     Packs/day: 0.25     Years: 20.00     Pack years: 5.00     Types: Cigarettes   • Smokeless tobacco: " Never Used   Vaping Use   • Vaping Use: Never used   Substance and Sexual Activity   • Alcohol use: Yes   • Drug use: Never   • Sexual activity: Yes     Partners: Male         No visits with results within 3 Month(s) from this visit.   Latest known visit with results is:   No results found for any previous visit.         Physical Exam  Vitals and nursing note reviewed.   Constitutional:       Appearance: Normal appearance. She is normal weight.   HENT:      Head: Normocephalic and atraumatic.   Cardiovascular:      Rate and Rhythm: Normal rate and regular rhythm.      Pulses: Normal pulses.      Heart sounds: Normal heart sounds. No murmur heard.    No friction rub. No gallop.   Pulmonary:      Effort: Pulmonary effort is normal. No respiratory distress.      Breath sounds: Normal breath sounds. No stridor. No wheezing, rhonchi or rales.   Chest:      Chest wall: No tenderness.   Abdominal:      General: Abdomen is flat. Bowel sounds are normal. There is no distension.      Palpations: Abdomen is soft. There is no mass.      Tenderness: There is no abdominal tenderness. There is no right CVA tenderness, left CVA tenderness, guarding or rebound.      Hernia: No hernia is present.   Skin:     General: Skin is warm and dry.      Capillary Refill: Capillary refill takes less than 2 seconds.      Coloration: Skin is not jaundiced or pale.   Neurological:      General: No focal deficit present.      Mental Status: She is alert and oriented to person, place, and time. Mental status is at baseline.      Motor: No weakness.      Coordination: Coordination normal.      Gait: Gait normal.   Psychiatric:         Mood and Affect: Mood normal.         Behavior: Behavior normal.         Thought Content: Thought content normal.         Judgment: Judgment normal.          Result Review  Data Reviewed:{ Labs  Result Review  Imaging  Med Tab  Media :23}   I reviewed this patient's chart.  I reviewed previous labs, previous  imaging, previous medications, and previous encounters with notes.           Assessment and Plan {CC Problem List  Visit Diagnosis  ROS  Review (Popup)  [facility] Maintenance  Quality  BestPractice  Medications  SmartSets  SnapShot Encounters  Media :23}   Diagnoses and all orders for this visit:    1. Annual physical exam (Primary)    2. Gastroesophageal reflux disease, unspecified whether esophagitis present  -     Discontinue: omeprazole (priLOSEC) 20 MG capsule; Take 1 capsule by mouth Daily.  Dispense: 90 capsule; Refill: 1  -     Comprehensive Metabolic Panel  -     CBC & Differential    3. Mixed hyperlipidemia  -     atorvastatin (LIPITOR) 10 MG tablet; Take 1 tablet by mouth Daily.  Dispense: 90 tablet; Refill: 3  -     Lipid Panel  -     omeprazole (priLOSEC) 40 MG capsule; Take 1 capsule by mouth Daily.  Dispense: 90 capsule; Refill: 1    4. Encounter for smoking cessation counseling  -     nicotine polacrilex (Nicotine Mini) 4 MG lozenge; Dissolve 1 lozenge in the mouth As Needed for Smoking Cessation.  Dispense: 108 each; Refill: 3    5. Screening for thyroid disorder  -     TSH    6. Screening for diabetes mellitus  -     Hemoglobin A1c    7. Encounter to establish care    8. History of hiatal hernia  -     Ambulatory Referral to Gastroenterology      -Increase omeprazole to 40 mg daily.  -Referral to GI to further evaluate need for upper endoscopy at patient's request.  -Fasting labs at today's visit.  -Smoking cessation discussed with patient.  Medication sent.  Offered referral to [place]'s quit now program.  -Follow-up in 6 months for next routine visit.  Patient has shared that she will follow-up over the phone in the meantime if she needs anything or she is more than welcome to come back for another appointment.    I spent 45 minutes caring for Dank on this date of service. This time includes time spent by me in the following activities:preparing for the visit, reviewing tests, obtaining  and/or reviewing a separately obtained history, performing a medically appropriate examination and/or evaluation , counseling and educating the patient/family/caregiver, ordering medications, tests, or procedures, referring and communicating with other health care professionals , documenting information in the medical record, independently interpreting results and communicating that information with the patient/family/caregiver and care coordination.    Follow Up {Instructions Charge Capture  Follow-up Communications :23}     Patient was given instructions and counseling regarding her condition or for health maintenance advice. Please see specific information pulled into the AVS (placed there by myself) if appropriate.    Return in about 6 months (around 1/8/2023).    MDM  Number of Diagnoses or Management Options  Annual physical exam: new, needed workup  Encounter for smoking cessation counseling: new, needed workup  Encounter to establish care: new, needed workup  Gastroesophageal reflux disease, unspecified whether esophagitis present: established, worsening  History of hiatal hernia: established, worsening  Mixed hyperlipidemia: established, improving  Screening for diabetes mellitus: established, improving  Screening for thyroid disorder: established, improving     Amount and/or Complexity of Data Reviewed  Clinical lab tests: ordered and reviewed  Tests in the radiology section of CPT®: reviewed  Tests in the medicine section of CPT®: reviewed  Discussion of test results with the performing providers: no  Decide to obtain previous medical records or to obtain history from someone other than the patient: yes  Obtain history from someone other than the patient: no  Review and summarize past medical records: yes  Discuss the patient with other providers: no  Independent visualization of images, tracings, or specimens: no    Risk of Complications, Morbidity, and/or Mortality  Presenting problems:  high  Diagnostic procedures: low  Management options: high    Critical Care  Total time providing critical care: 30-74 minutes    Patient Progress  Patient progress: stable       KEENA Mccarthy, FNP-BC

## 2022-07-08 NOTE — PROGRESS NOTES
Venipuncture Blood Specimen Collection  Venipuncture performed in L arm by BREN PICHARDO MA with good hemostasis. Patient tolerated the procedure well without complications.   07/08/22   BREN PICHARDO MA

## 2022-07-09 LAB
ALBUMIN SERPL-MCNC: 3.9 G/DL (ref 3.5–5.2)
ALBUMIN/GLOB SERPL: 1.8 G/DL
ALP SERPL-CCNC: 79 U/L (ref 39–117)
ALT SERPL W P-5'-P-CCNC: 31 U/L (ref 1–33)
ANION GAP SERPL CALCULATED.3IONS-SCNC: 9.7 MMOL/L (ref 5–15)
AST SERPL-CCNC: 20 U/L (ref 1–32)
BASOPHILS # BLD AUTO: 0.03 10*3/MM3 (ref 0–0.2)
BASOPHILS NFR BLD AUTO: 0.4 % (ref 0–1.5)
BILIRUB SERPL-MCNC: 0.4 MG/DL (ref 0–1.2)
BUN SERPL-MCNC: 14 MG/DL (ref 6–20)
BUN/CREAT SERPL: 15.6 (ref 7–25)
CALCIUM SPEC-SCNC: 8.6 MG/DL (ref 8.6–10.5)
CHLORIDE SERPL-SCNC: 102 MMOL/L (ref 98–107)
CHOLEST SERPL-MCNC: 186 MG/DL (ref 0–200)
CO2 SERPL-SCNC: 25.3 MMOL/L (ref 22–29)
CREAT SERPL-MCNC: 0.9 MG/DL (ref 0.57–1)
DEPRECATED RDW RBC AUTO: 41.3 FL (ref 37–54)
EGFRCR SERPLBLD CKD-EPI 2021: 83.6 ML/MIN/1.73
EOSINOPHIL # BLD AUTO: 0.14 10*3/MM3 (ref 0–0.4)
EOSINOPHIL NFR BLD AUTO: 2.1 % (ref 0.3–6.2)
ERYTHROCYTE [DISTWIDTH] IN BLOOD BY AUTOMATED COUNT: 13 % (ref 12.3–15.4)
GLOBULIN UR ELPH-MCNC: 2.2 GM/DL
GLUCOSE SERPL-MCNC: 109 MG/DL (ref 65–99)
HCT VFR BLD AUTO: 44 % (ref 34–46.6)
HDLC SERPL-MCNC: 40 MG/DL (ref 40–60)
HGB BLD-MCNC: 15.6 G/DL (ref 12–15.9)
IMM GRANULOCYTES # BLD AUTO: 0.04 10*3/MM3 (ref 0–0.05)
IMM GRANULOCYTES NFR BLD AUTO: 0.6 % (ref 0–0.5)
LDLC SERPL CALC-MCNC: 97 MG/DL (ref 0–100)
LDLC/HDLC SERPL: 2.18 {RATIO}
LYMPHOCYTES # BLD AUTO: 2.02 10*3/MM3 (ref 0.7–3.1)
LYMPHOCYTES NFR BLD AUTO: 30 % (ref 19.6–45.3)
MCH RBC QN AUTO: 31.9 PG (ref 26.6–33)
MCHC RBC AUTO-ENTMCNC: 35.5 G/DL (ref 31.5–35.7)
MCV RBC AUTO: 90 FL (ref 79–97)
MONOCYTES # BLD AUTO: 0.5 10*3/MM3 (ref 0.1–0.9)
MONOCYTES NFR BLD AUTO: 7.4 % (ref 5–12)
NEUTROPHILS NFR BLD AUTO: 4.01 10*3/MM3 (ref 1.7–7)
NEUTROPHILS NFR BLD AUTO: 59.5 % (ref 42.7–76)
NRBC BLD AUTO-RTO: 0 /100 WBC (ref 0–0.2)
PLATELET # BLD AUTO: 229 10*3/MM3 (ref 140–450)
PMV BLD AUTO: 11.7 FL (ref 6–12)
POTASSIUM SERPL-SCNC: 3.9 MMOL/L (ref 3.5–5.2)
PROT SERPL-MCNC: 6.1 G/DL (ref 6–8.5)
RBC # BLD AUTO: 4.89 10*6/MM3 (ref 3.77–5.28)
SODIUM SERPL-SCNC: 137 MMOL/L (ref 136–145)
TRIGL SERPL-MCNC: 294 MG/DL (ref 0–150)
TSH SERPL DL<=0.05 MIU/L-ACNC: 1.92 UIU/ML (ref 0.27–4.2)
VLDLC SERPL-MCNC: 49 MG/DL (ref 5–40)
WBC NRBC COR # BLD: 6.74 10*3/MM3 (ref 3.4–10.8)

## 2022-08-12 ENCOUNTER — TELEMEDICINE (OUTPATIENT)
Dept: FAMILY MEDICINE CLINIC | Facility: CLINIC | Age: 42
End: 2022-08-12

## 2022-08-12 DIAGNOSIS — M79.661 RIGHT CALF PAIN: ICD-10-CM

## 2022-08-12 DIAGNOSIS — S89.91XA BLUNT TRAUMA OF RIGHT LOWER LEG, INITIAL ENCOUNTER: Primary | ICD-10-CM

## 2022-08-12 PROCEDURE — 99213 OFFICE O/P EST LOW 20 MIN: CPT | Performed by: REGISTERED NURSE

## 2022-08-12 NOTE — PROGRESS NOTES
[facility] PRIMARY CARE  Patient: Dank Cevallos   Age: 41 y.o.  Sex: female  :  1980  MRN: [id number]    Dank Cevallos was located at home and I was located at my office for this telemedicine/telephone encounter. We utilized the telephone visit option due to patient having difficulty with [employer] video option for the encounter and Dank Cevallos and I were able to hear each other simultaneously in real time. I introduced myself and verified Dank Cevallos identity. I explained how the telemedicine visit will occur. I advised Dank Cevallos that technology-related delays and breaches of privacy are potential risks associated with conducting the encounter via telemedicine.  I also advised Dank Cevallos that at any point she may terminate the telemedicine encounter and withdraw her consent for receiving care via telemedicine without affecting her ability to receive future care from us, and that I may also terminate the telemedicine encounter if I determine that an in-person visit is more appropriate for the condition[s] for which treatment is sought.  Having covered these considerations, Dank Cevallos verbally acknowledged them and gave consent for the use of telemedicine in her care.  Start time: 1503  End time 1515    CHIEF COMPLAINT:  Chief Complaint   Patient presents with   • Leg Pain        The following portions of the chart were reviewed this encounter and updated as appropriate:    Patient is a 41-year-old female who presents to the clinic today via telephone visit with concerns of right calf pain x2 weeks.  Patient denies any chest pain, shortness of breath, or any fevers.  Patient denies any known exposure to COVID, flu, or any other contagious illnesses.    Patient shares that she was outside and doing significant activity when jumping and landing on her leg causing blunt trauma to her right leg.  She shares that when she landed she felt pain and heard a very loud  popping sound from her calf.  Patient shares that she has been in pain since this 2 weeks ago has been trying to heal at home but shares that it is not significantly improving.  Patient would like to further investigate.  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       There were no vitals taken for this visit.   Allergies   Allergen Reactions   • Adhesive Tape Rash and Irritability     Past Medical History:   Diagnosis Date   • Anxiety    • Colon polyp    • Depression    • GERD (gastroesophageal reflux disease)    • Headache    • Heart murmur    • Hyperlipidemia    • Hypertension    • Irritable bowel syndrome    • Low back pain        REVIEW OF SYSTEMS  Review of Systems   Constitutional: Negative.  Negative for activity change, chills, fatigue and fever.   HENT: Negative.  Negative for congestion, dental problem, ear pain, hearing loss, rhinorrhea, sinus pain, sore throat, tinnitus and trouble swallowing.    Eyes: Negative.  Negative for pain and visual disturbance.   Respiratory: Negative.  Negative for cough, chest tightness, shortness of breath and wheezing.    Cardiovascular: Negative.  Negative for chest pain, palpitations and leg swelling.   Gastrointestinal: Negative.  Negative for abdominal pain, diarrhea, nausea and vomiting.   Endocrine: Negative.  Negative for polydipsia, polyphagia and polyuria.   Genitourinary: Negative.  Negative for difficulty urinating, dysuria, frequency and urgency.   Musculoskeletal: Positive for gait problem and myalgias (Right lower extremity). Negative for arthralgias and back pain.   Skin: Negative.  Negative for color change, pallor, rash and wound.   Allergic/Immunologic: Negative.  Negative for environmental allergies.   Neurological: Negative for dizziness, speech difficulty, weakness, light-headedness, numbness and headaches.   Hematological: Negative.    Psychiatric/Behavioral:  Negative.  Negative for confusion, decreased concentration, self-injury and suicidal ideas. The patient is not nervous/anxious.    All other systems reviewed and are negative.             LAB REVIEW  Office Visit on 07/08/2022   Component Date Value Ref Range Status   • Hemoglobin A1C 07/08/2022 5.1  3.5 - 5.6 % Final   • Glucose 07/08/2022 109 (A) 65 - 99 mg/dL Final   • BUN 07/08/2022 14  6 - 20 mg/dL Final   • Creatinine 07/08/2022 0.90  0.57 - 1.00 mg/dL Final   • Sodium 07/08/2022 137  136 - 145 mmol/L Final   • Potassium 07/08/2022 3.9  3.5 - 5.2 mmol/L Final   • Chloride 07/08/2022 102  98 - 107 mmol/L Final   • CO2 07/08/2022 25.3  22.0 - 29.0 mmol/L Final   • Calcium 07/08/2022 8.6  8.6 - 10.5 mg/dL Final   • Total Protein 07/08/2022 6.1  6.0 - 8.5 g/dL Final   • Albumin 07/08/2022 3.90  3.50 - 5.20 g/dL Final   • ALT (SGPT) 07/08/2022 31  1 - 33 U/L Final   • AST (SGOT) 07/08/2022 20  1 - 32 U/L Final   • Alkaline Phosphatase 07/08/2022 79  39 - 117 U/L Final   • Total Bilirubin 07/08/2022 0.4  0.0 - 1.2 mg/dL Final   • Globulin 07/08/2022 2.2  gm/dL Final   • A/G Ratio 07/08/2022 1.8  g/dL Final   • BUN/Creatinine Ratio 07/08/2022 15.6  7.0 - 25.0 Final   • Anion Gap 07/08/2022 9.7  5.0 - 15.0 mmol/L Final   • eGFR 07/08/2022 83.6  >60.0 mL/min/1.73 Final    National Kidney Foundation and American Society of Nephrology (ASN) Task Force recommended calculation based on the Chronic Kidney Disease Epidemiology Collaboration (CKD-EPI) equation refit without adjustment for race.   • Total Cholesterol 07/08/2022 186  0 - 200 mg/dL Final   • Triglycerides 07/08/2022 294 (A) 0 - 150 mg/dL Final   • HDL Cholesterol 07/08/2022 40  40 - 60 mg/dL Final   • LDL Cholesterol  07/08/2022 97  0 - 100 mg/dL Final   • VLDL Cholesterol 07/08/2022 49 (A) 5 - 40 mg/dL Final   • LDL/HDL Ratio 07/08/2022 2.18   Final   • TSH 07/08/2022 1.920  0.270 - 4.200 uIU/mL Final   • WBC 07/08/2022 6.74  3.40 - 10.80 10*3/mm3 Final   • RBC  07/08/2022 4.89  3.77 - 5.28 10*6/mm3 Final   • Hemoglobin 07/08/2022 15.6  12.0 - 15.9 g/dL Final   • Hematocrit 07/08/2022 44.0  34.0 - 46.6 % Final   • MCV 07/08/2022 90.0  79.0 - 97.0 fL Final   • MCH 07/08/2022 31.9  26.6 - 33.0 pg Final   • MCHC 07/08/2022 35.5  31.5 - 35.7 g/dL Final   • RDW 07/08/2022 13.0  12.3 - 15.4 % Final   • RDW-SD 07/08/2022 41.3  37.0 - 54.0 fl Final   • MPV 07/08/2022 11.7  6.0 - 12.0 fL Final   • Platelets 07/08/2022 229  140 - 450 10*3/mm3 Final   • Neutrophil % 07/08/2022 59.5  42.7 - 76.0 % Final   • Lymphocyte % 07/08/2022 30.0  19.6 - 45.3 % Final   • Monocyte % 07/08/2022 7.4  5.0 - 12.0 % Final   • Eosinophil % 07/08/2022 2.1  0.3 - 6.2 % Final   • Basophil % 07/08/2022 0.4  0.0 - 1.5 % Final   • Immature Grans % 07/08/2022 0.6 (A) 0.0 - 0.5 % Final   • Neutrophils, Absolute 07/08/2022 4.01  1.70 - 7.00 10*3/mm3 Final   • Lymphocytes, Absolute 07/08/2022 2.02  0.70 - 3.10 10*3/mm3 Final   • Monocytes, Absolute 07/08/2022 0.50  0.10 - 0.90 10*3/mm3 Final   • Eosinophils, Absolute 07/08/2022 0.14  0.00 - 0.40 10*3/mm3 Final   • Basophils, Absolute 07/08/2022 0.03  0.00 - 0.20 10*3/mm3 Final   • Immature Grans, Absolute 07/08/2022 0.04  0.00 - 0.05 10*3/mm3 Final   • nRBC 07/08/2022 0.0  0.0 - 0.2 /100 WBC Final           Diagnoses and all orders for this visit:    1. Blunt trauma of right lower leg, initial encounter (Primary)  -     MRI Tibia Fibula Right Without Contrast; Future    2. Right calf pain  -     MRI Tibia Fibula Right Without Contrast; Future          1. Blunt trauma of right lower leg, initial encounter    2. Right calf pain       -MRI of right calf ordered.  Trend to rule out rupture of tendon and calf.  -Follow-up in 1 week if not improving.  We will discuss orthopedic referral based upon findings of MRI.    MDM  Number of Diagnoses or Management Options  Blunt trauma of right lower leg, initial encounter: new, needed workup  Right calf pain: new, needed  workup     Amount and/or Complexity of Data Reviewed  Clinical lab tests: reviewed  Tests in the radiology section of CPT®: ordered and reviewed  Tests in the medicine section of CPT®: reviewed  Discussion of test results with the performing providers: no  Decide to obtain previous medical records or to obtain history from someone other than the patient: no  Obtain history from someone other than the patient: no  Review and summarize past medical records: yes  Discuss the patient with other providers: no  Independent visualization of images, tracings, or specimens: no    Risk of Complications, Morbidity, and/or Mortality  Presenting problems: high  Diagnostic procedures: low  Management options: high    Critical Care  Total time providing critical care: < 30 minutes    Patient Progress  Patient progress: stable      KEENA Mccarthy, FNP-BC  8/12/2022 15:49 EDT

## 2022-08-24 ENCOUNTER — OFFICE VISIT (OUTPATIENT)
Dept: FAMILY MEDICINE CLINIC | Facility: CLINIC | Age: 42
End: 2022-08-24

## 2022-08-24 VITALS
RESPIRATION RATE: 16 BRPM | DIASTOLIC BLOOD PRESSURE: 84 MMHG | WEIGHT: 217 LBS | OXYGEN SATURATION: 96 % | SYSTOLIC BLOOD PRESSURE: 133 MMHG | HEART RATE: 74 BPM | TEMPERATURE: 97.7 F | BODY MASS INDEX: 32.14 KG/M2 | HEIGHT: 69 IN

## 2022-08-24 DIAGNOSIS — M25.50 ARTHRALGIA, UNSPECIFIED JOINT: ICD-10-CM

## 2022-08-24 DIAGNOSIS — R21 RASH OF UNKNOWN CAUSE: ICD-10-CM

## 2022-08-24 DIAGNOSIS — Z82.69 FAMILY HISTORY OF SYSTEMIC LUPUS ERYTHEMATOSUS: Primary | ICD-10-CM

## 2022-08-24 PROBLEM — R32 BLADDER INCONTINENCE: Status: ACTIVE | Noted: 2022-08-24

## 2022-08-24 PROCEDURE — 86038 ANTINUCLEAR ANTIBODIES: CPT | Performed by: REGISTERED NURSE

## 2022-08-24 PROCEDURE — 99214 OFFICE O/P EST MOD 30 MIN: CPT | Performed by: REGISTERED NURSE

## 2022-08-24 RX ORDER — DIPHENOXYLATE HYDROCHLORIDE AND ATROPINE SULFATE 2.5; .025 MG/1; MG/1
TABLET ORAL DAILY
COMMUNITY

## 2022-08-24 RX ORDER — CHLORAL HYDRATE 500 MG
CAPSULE ORAL
COMMUNITY

## 2022-08-24 NOTE — PROGRESS NOTES
Chief Complaint  Rash (Rash on lower legs- hospital f/u)    Subjective    History of Present Illness {CC  Problem List  Visit  Diagnosis   Encounters  Notes  Medications  Labs  Result Review Imaging  Media :23}     Dank Cevallos presents to [facility] PRIMARY CARE for Rash (Rash on lower legs- hospital f/u).    Patient is a 41-year-old female who presents to the clinic today to follow-up after an ER visit x1 week.  Patient denies any chest pain, shortness of breath, or any fevers.  Patient denies any known exposure to COVID, flu, or any other contagious illnesses.    Regarding her rash, patient says that it is improving.  The rash was on her right lower leg.  We did have imaging in place for this leg and her pain and discomfort of the leg.  Patient has not been able to get this approved through insurance yet so she went to the ER to be evaluated.    Patient shares that she is concerned that she has lupus.  Patient shares that her Mom has lupus.  She shares that she has several symptoms of lupus including joint pain, ibs, interstitial cystitis, swelling of extremities, and hair loss.  Patient would like to proceed with further testing for lupus.           Review of Systems   Constitutional: Negative.  Negative for activity change, chills, fatigue and fever.   HENT: Negative.  Negative for congestion, dental problem, ear pain, hearing loss, rhinorrhea, sinus pain, sore throat, tinnitus and trouble swallowing.    Eyes: Negative.  Negative for pain and visual disturbance.   Respiratory: Negative.  Negative for cough, chest tightness, shortness of breath and wheezing.    Cardiovascular: Positive for leg swelling (Right worse than left). Negative for chest pain and palpitations.   Gastrointestinal: Positive for constipation (Chronic IBS) and diarrhea (Chronic IBS). Negative for abdominal pain, nausea and vomiting.   Endocrine: Negative.  Negative for polydipsia, polyphagia and polyuria.  "  Genitourinary: Negative.  Negative for difficulty urinating, dysuria, frequency and urgency.   Musculoskeletal: Positive for arthralgias (Chronic joint pain). Negative for back pain and myalgias.   Skin: Negative.  Negative for color change, pallor, rash and wound.   Allergic/Immunologic: Negative.  Negative for environmental allergies.   Neurological: Negative.  Negative for dizziness, speech difficulty, weakness, light-headedness, numbness and headaches.   Hematological: Negative.    Psychiatric/Behavioral: Negative.  Negative for confusion, decreased concentration, self-injury and suicidal ideas. The patient is not nervous/anxious.    All other systems reviewed and are negative.       Objective     Vital Signs:   /84 (BP Location: Right arm, Patient Position: Sitting, Cuff Size: Adult)   Pulse 74   Temp 97.7 °F (36.5 °C) (Temporal)   Resp 16   Ht 175.3 cm (69\")   Wt 98.4 kg (217 lb)   SpO2 96%   BMI 32.05 kg/m²   Current Outpatient Medications on File Prior to Visit   Medication Sig Dispense Refill   • atorvastatin (LIPITOR) 10 MG tablet Take 1 tablet by mouth Daily. 90 tablet 3   • buPROPion XL (FORFIVO XL) 450 MG 24 hr tablet Take 450 mg by mouth Daily.     • Calcium Carb-Cholecalciferol 600-500 MG-UNIT capsule Take  by mouth Daily.     • Coenzyme Q10 (CO Q 10 PO) Take 10 mL by mouth Daily.     • multivitamin (MULTIVITAMIN PO) Take  by mouth Daily.     • nicotine polacrilex (Nicotine Mini) 4 MG lozenge Dissolve 1 lozenge in the mouth As Needed for Smoking Cessation. 108 each 3   • Omega-3 Fatty Acids (fish oil) 1000 MG capsule capsule Take  by mouth Daily With Breakfast.     • omeprazole (priLOSEC) 40 MG capsule Take 1 capsule by mouth Daily. 90 capsule 1   • TURMERIC PO Take 20 mL by mouth Every Night.       No current facility-administered medications on file prior to visit.        Past Medical History:   Diagnosis Date   • Anxiety    • Bladder incontinence    • Colon polyp    • Depression    • " GERD (gastroesophageal reflux disease)    • Headache    • Heart murmur    • Hyperlipidemia    • Hypertension    • Irritable bowel syndrome    • Low back pain       Past Surgical History:   Procedure Laterality Date   • COLONOSCOPY  2010    negative   • COLONOSCOPY  2006    polyps   • CYST REMOVAL  2000    On neck   • ENDOMETRIAL ABLATION  04/2021   • SUBTOTAL HYSTERECTOMY  06/2021    Partial      Family History   Problem Relation Age of Onset   • Bipolar disorder Mother    • Lupus Mother    • No Known Problems Father    • Irritable bowel syndrome Sister    • Fibromyalgia Sister    • No Known Problems Brother    • Hypertension Daughter    • Cancer Maternal Grandmother    • Alcohol abuse Maternal Grandfather    • Hypertension Maternal Grandfather    • Liver cancer Maternal Grandfather    • Diabetes Paternal Grandmother    • Sleep apnea Paternal Grandmother    • No Known Problems Paternal Grandfather       Social History     Socioeconomic History   • Marital status: Single   Tobacco Use   • Smoking status: Light Tobacco Smoker     Packs/day: 0.25     Years: 20.00     Pack years: 5.00     Types: Cigarettes   • Smokeless tobacco: Never Used   Vaping Use   • Vaping Use: Never used   Substance and Sexual Activity   • Alcohol use: Yes     Comment: Social occassion   • Drug use: Never   • Sexual activity: Yes     Partners: Male         Office Visit on 07/08/2022   Component Date Value Ref Range Status   • Hemoglobin A1C 07/08/2022 5.1  3.5 - 5.6 % Final   • Glucose 07/08/2022 109 (A) 65 - 99 mg/dL Final   • BUN 07/08/2022 14  6 - 20 mg/dL Final   • Creatinine 07/08/2022 0.90  0.57 - 1.00 mg/dL Final   • Sodium 07/08/2022 137  136 - 145 mmol/L Final   • Potassium 07/08/2022 3.9  3.5 - 5.2 mmol/L Final   • Chloride 07/08/2022 102  98 - 107 mmol/L Final   • CO2 07/08/2022 25.3  22.0 - 29.0 mmol/L Final   • Calcium 07/08/2022 8.6  8.6 - 10.5 mg/dL Final   • Total Protein 07/08/2022 6.1  6.0 - 8.5 g/dL Final   • Albumin  07/08/2022 3.90  3.50 - 5.20 g/dL Final   • ALT (SGPT) 07/08/2022 31  1 - 33 U/L Final   • AST (SGOT) 07/08/2022 20  1 - 32 U/L Final   • Alkaline Phosphatase 07/08/2022 79  39 - 117 U/L Final   • Total Bilirubin 07/08/2022 0.4  0.0 - 1.2 mg/dL Final   • Globulin 07/08/2022 2.2  gm/dL Final   • A/G Ratio 07/08/2022 1.8  g/dL Final   • BUN/Creatinine Ratio 07/08/2022 15.6  7.0 - 25.0 Final   • Anion Gap 07/08/2022 9.7  5.0 - 15.0 mmol/L Final   • eGFR 07/08/2022 83.6  >60.0 mL/min/1.73 Final    National Kidney Foundation and American Society of Nephrology (ASN) Task Force recommended calculation based on the Chronic Kidney Disease Epidemiology Collaboration (CKD-EPI) equation refit without adjustment for race.   • Total Cholesterol 07/08/2022 186  0 - 200 mg/dL Final   • Triglycerides 07/08/2022 294 (A) 0 - 150 mg/dL Final   • HDL Cholesterol 07/08/2022 40  40 - 60 mg/dL Final   • LDL Cholesterol  07/08/2022 97  0 - 100 mg/dL Final   • VLDL Cholesterol 07/08/2022 49 (A) 5 - 40 mg/dL Final   • LDL/HDL Ratio 07/08/2022 2.18   Final   • TSH 07/08/2022 1.920  0.270 - 4.200 uIU/mL Final   • WBC 07/08/2022 6.74  3.40 - 10.80 10*3/mm3 Final   • RBC 07/08/2022 4.89  3.77 - 5.28 10*6/mm3 Final   • Hemoglobin 07/08/2022 15.6  12.0 - 15.9 g/dL Final   • Hematocrit 07/08/2022 44.0  34.0 - 46.6 % Final   • MCV 07/08/2022 90.0  79.0 - 97.0 fL Final   • MCH 07/08/2022 31.9  26.6 - 33.0 pg Final   • MCHC 07/08/2022 35.5  31.5 - 35.7 g/dL Final   • RDW 07/08/2022 13.0  12.3 - 15.4 % Final   • RDW-SD 07/08/2022 41.3  37.0 - 54.0 fl Final   • MPV 07/08/2022 11.7  6.0 - 12.0 fL Final   • Platelets 07/08/2022 229  140 - 450 10*3/mm3 Final   • Neutrophil % 07/08/2022 59.5  42.7 - 76.0 % Final   • Lymphocyte % 07/08/2022 30.0  19.6 - 45.3 % Final   • Monocyte % 07/08/2022 7.4  5.0 - 12.0 % Final   • Eosinophil % 07/08/2022 2.1  0.3 - 6.2 % Final   • Basophil % 07/08/2022 0.4  0.0 - 1.5 % Final   • Immature Grans % 07/08/2022 0.6 (A) 0.0 -  0.5 % Final   • Neutrophils, Absolute 07/08/2022 4.01  1.70 - 7.00 10*3/mm3 Final   • Lymphocytes, Absolute 07/08/2022 2.02  0.70 - 3.10 10*3/mm3 Final   • Monocytes, Absolute 07/08/2022 0.50  0.10 - 0.90 10*3/mm3 Final   • Eosinophils, Absolute 07/08/2022 0.14  0.00 - 0.40 10*3/mm3 Final   • Basophils, Absolute 07/08/2022 0.03  0.00 - 0.20 10*3/mm3 Final   • Immature Grans, Absolute 07/08/2022 0.04  0.00 - 0.05 10*3/mm3 Final   • nRBC 07/08/2022 0.0  0.0 - 0.2 /100 WBC Final         Physical Exam  Vitals and nursing note reviewed.   Constitutional:       Appearance: Normal appearance. She is normal weight.   HENT:      Head: Normocephalic and atraumatic.   Cardiovascular:      Rate and Rhythm: Normal rate and regular rhythm.      Pulses: Normal pulses.      Heart sounds: Normal heart sounds. No murmur heard.    No friction rub. No gallop.   Pulmonary:      Effort: Pulmonary effort is normal. No respiratory distress.      Breath sounds: Normal breath sounds. No stridor. No wheezing, rhonchi or rales.   Chest:      Chest wall: No tenderness.   Abdominal:      General: Abdomen is flat. Bowel sounds are normal. There is no distension.      Palpations: Abdomen is soft. There is no mass.      Tenderness: There is no abdominal tenderness. There is no right CVA tenderness, left CVA tenderness, guarding or rebound.      Hernia: No hernia is present.   Musculoskeletal:      Right knee: Swelling present. Decreased range of motion. Tenderness present.      Left knee: Decreased range of motion. Tenderness present.   Skin:     General: Skin is warm and dry.      Capillary Refill: Capillary refill takes less than 2 seconds.      Coloration: Skin is not jaundiced or pale.   Neurological:      General: No focal deficit present.      Mental Status: She is alert and oriented to person, place, and time. Mental status is at baseline.      Motor: No weakness.      Coordination: Coordination normal.      Gait: Gait normal.   Psychiatric:          Mood and Affect: Mood normal.         Behavior: Behavior normal.         Thought Content: Thought content normal.         Judgment: Judgment normal.          Result Review  Data Reviewed:{ Labs  Result Review  Imaging  Med Tab  Media :23}   I have reviewed this patient's chart.  I have reviewed previous labs, previous imaging, previous medications, and previous encounters with notes that were available in this patient's chart.             Assessment and Plan {CC Problem List  Visit Diagnosis  ROS  Review (Popup)  Health Maintenance  Quality  BestPractice  Medications  SmartSets  SnapShot Encounters  Media :23}   Diagnoses and all orders for this visit:    1. Family history of systemic lupus erythematosus (Primary)  -     FABIAN; Future  -     FABIAN    2. Arthralgia, unspecified joint  -     FABIAN; Future  -     FABIAN    3. Rash of unknown cause  -     FABIAN; Future  -     FABIAN    -FABIAN today to rule out potential for lupus.  -Discussed further lab testing if FAIBAN is positive.  Will order ESR, PTT, anti-DNA antibody, CRP, and other labs to rule out lupus  -Follow-up in 4 weeks or sooner if needed.    I spent 30 minutes caring for Dank on this date of service. This time includes time spent by me in the following activities:preparing for the visit, reviewing tests, obtaining and/or reviewing a separately obtained history, performing a medically appropriate examination and/or evaluation , counseling and educating the patient/family/caregiver, ordering medications, tests, or procedures, referring and communicating with other health care professionals , documenting information in the medical record, independently interpreting results and communicating that information with the patient/family/caregiver and care coordination.    Follow Up {Instructions Charge Capture  Follow-up Communications :23}     Patient was given instructions and counseling regarding her condition or for health maintenance advice. Please  see specific information pulled into the AVS (placed there by myself) if appropriate.    Return in about 4 weeks (around 9/21/2022).    MDM  Number of Diagnoses or Management Options  Arthralgia, unspecified joint: established, worsening  Family history of systemic lupus erythematosus: established, worsening  Rash of unknown cause: established, improving     Amount and/or Complexity of Data Reviewed  Clinical lab tests: ordered and reviewed  Tests in the radiology section of CPT®: reviewed  Tests in the medicine section of CPT®: reviewed  Discussion of test results with the performing providers: no  Decide to obtain previous medical records or to obtain history from someone other than the patient: no  Obtain history from someone other than the patient: no  Review and summarize past medical records: yes  Discuss the patient with other providers: no  Independent visualization of images, tracings, or specimens: no    Risk of Complications, Morbidity, and/or Mortality  Presenting problems: moderate  Diagnostic procedures: minimal  Management options: low    Critical Care  Total time providing critical care: 30-74 minutes    Patient Progress  Patient progress: stable       KEENA Mccarthy, FNP-BC

## 2022-08-24 NOTE — PROGRESS NOTES
Venipuncture Blood Specimen Collection  Venipuncture performed in L arm by BREN PICHARDO MA with good hemostasis. Patient tolerated the procedure well without complications.   08/24/22   BREN PICHARDO MA

## 2022-08-26 LAB — ANA SER QL: NEGATIVE

## 2022-09-13 ENCOUNTER — OFFICE (OUTPATIENT)
Dept: URBAN - METROPOLITAN AREA PATHOLOGY 4 | Facility: PATHOLOGY | Age: 42
End: 2022-09-13
Payer: COMMERCIAL

## 2022-09-13 ENCOUNTER — ON CAMPUS - OUTPATIENT (OUTPATIENT)
Dept: URBAN - METROPOLITAN AREA HOSPITAL 2 | Facility: HOSPITAL | Age: 42
End: 2022-09-13
Payer: COMMERCIAL

## 2022-09-13 VITALS
SYSTOLIC BLOOD PRESSURE: 113 MMHG | SYSTOLIC BLOOD PRESSURE: 121 MMHG | HEART RATE: 72 BPM | OXYGEN SATURATION: 100 % | DIASTOLIC BLOOD PRESSURE: 58 MMHG | SYSTOLIC BLOOD PRESSURE: 138 MMHG | SYSTOLIC BLOOD PRESSURE: 139 MMHG | OXYGEN SATURATION: 99 % | SYSTOLIC BLOOD PRESSURE: 118 MMHG | HEART RATE: 70 BPM | WEIGHT: 214 LBS | SYSTOLIC BLOOD PRESSURE: 132 MMHG | HEART RATE: 71 BPM | DIASTOLIC BLOOD PRESSURE: 78 MMHG | DIASTOLIC BLOOD PRESSURE: 74 MMHG | SYSTOLIC BLOOD PRESSURE: 105 MMHG | DIASTOLIC BLOOD PRESSURE: 72 MMHG | RESPIRATION RATE: 16 BRPM | HEIGHT: 69 IN | HEART RATE: 69 BPM | DIASTOLIC BLOOD PRESSURE: 55 MMHG | SYSTOLIC BLOOD PRESSURE: 131 MMHG | TEMPERATURE: 98.6 F | OXYGEN SATURATION: 98 % | DIASTOLIC BLOOD PRESSURE: 65 MMHG | RESPIRATION RATE: 18 BRPM | DIASTOLIC BLOOD PRESSURE: 83 MMHG | HEART RATE: 74 BPM | DIASTOLIC BLOOD PRESSURE: 50 MMHG | SYSTOLIC BLOOD PRESSURE: 122 MMHG | HEART RATE: 76 BPM | OXYGEN SATURATION: 97 % | DIASTOLIC BLOOD PRESSURE: 71 MMHG | SYSTOLIC BLOOD PRESSURE: 108 MMHG

## 2022-09-13 DIAGNOSIS — R19.7 DIARRHEA, UNSPECIFIED: ICD-10-CM

## 2022-09-13 DIAGNOSIS — K21.9 GASTRO-ESOPHAGEAL REFLUX DISEASE WITHOUT ESOPHAGITIS: ICD-10-CM

## 2022-09-13 DIAGNOSIS — K44.9 DIAPHRAGMATIC HERNIA WITHOUT OBSTRUCTION OR GANGRENE: ICD-10-CM

## 2022-09-13 DIAGNOSIS — K62.1 RECTAL POLYP: ICD-10-CM

## 2022-09-13 DIAGNOSIS — Z86.010 PERSONAL HISTORY OF COLONIC POLYPS: ICD-10-CM

## 2022-09-13 DIAGNOSIS — D12.8 BENIGN NEOPLASM OF RECTUM: ICD-10-CM

## 2022-09-13 DIAGNOSIS — K62.5 HEMORRHAGE OF ANUS AND RECTUM: ICD-10-CM

## 2022-09-13 DIAGNOSIS — K31.89 OTHER DISEASES OF STOMACH AND DUODENUM: ICD-10-CM

## 2022-09-13 LAB
GI HISTOLOGY: A. SELECT: (no result)
GI HISTOLOGY: B. SELECT: (no result)
GI HISTOLOGY: C. SELECT: (no result)
GI HISTOLOGY: D. UNSPECIFIED: (no result)
GI HISTOLOGY: PDF REPORT: (no result)

## 2022-09-13 PROCEDURE — 45380 COLONOSCOPY AND BIOPSY: CPT | Mod: 59 | Performed by: INTERNAL MEDICINE

## 2022-09-13 PROCEDURE — 43239 EGD BIOPSY SINGLE/MULTIPLE: CPT | Performed by: INTERNAL MEDICINE

## 2022-09-13 PROCEDURE — 88305 TISSUE EXAM BY PATHOLOGIST: CPT | Performed by: INTERNAL MEDICINE

## 2022-09-13 PROCEDURE — 45385 COLONOSCOPY W/LESION REMOVAL: CPT | Performed by: INTERNAL MEDICINE

## 2022-10-10 ENCOUNTER — OFFICE (OUTPATIENT)
Dept: URBAN - METROPOLITAN AREA CLINIC 64 | Facility: CLINIC | Age: 42
End: 2022-10-10

## 2022-10-10 VITALS — HEIGHT: 69 IN

## 2022-10-10 DIAGNOSIS — K64.1 SECOND DEGREE HEMORRHOIDS: ICD-10-CM

## 2022-10-10 PROCEDURE — 46221 LIGATION OF HEMORRHOID(S): CPT | Performed by: INTERNAL MEDICINE

## 2022-11-11 ENCOUNTER — OFFICE (OUTPATIENT)
Dept: URBAN - METROPOLITAN AREA CLINIC 64 | Facility: CLINIC | Age: 42
End: 2022-11-11

## 2022-11-11 VITALS — HEIGHT: 69 IN

## 2022-11-11 DIAGNOSIS — K64.1 SECOND DEGREE HEMORRHOIDS: ICD-10-CM

## 2022-11-11 PROCEDURE — 46221 LIGATION OF HEMORRHOID(S): CPT | Performed by: INTERNAL MEDICINE

## 2023-02-16 DIAGNOSIS — E78.2 MIXED HYPERLIPIDEMIA: ICD-10-CM

## 2023-02-17 RX ORDER — OMEPRAZOLE 40 MG/1
CAPSULE, DELAYED RELEASE ORAL
Qty: 90 CAPSULE | Refills: 0 | Status: SHIPPED | OUTPATIENT
Start: 2023-02-17 | End: 2023-03-28 | Stop reason: SDUPTHER

## 2023-02-24 ENCOUNTER — OFFICE VISIT (OUTPATIENT)
Dept: OBSTETRICS AND GYNECOLOGY | Facility: CLINIC | Age: 43
End: 2023-02-24
Payer: COMMERCIAL

## 2023-02-24 VITALS
DIASTOLIC BLOOD PRESSURE: 78 MMHG | HEIGHT: 69 IN | BODY MASS INDEX: 32.08 KG/M2 | SYSTOLIC BLOOD PRESSURE: 124 MMHG | WEIGHT: 216.6 LBS

## 2023-02-24 DIAGNOSIS — Z01.419 PAP SMEAR, LOW-RISK: ICD-10-CM

## 2023-02-24 DIAGNOSIS — Z11.51 SPECIAL SCREENING EXAMINATION FOR HUMAN PAPILLOMAVIRUS (HPV): ICD-10-CM

## 2023-02-24 DIAGNOSIS — Z01.419 ROUTINE GYNECOLOGICAL EXAMINATION: Primary | ICD-10-CM

## 2023-02-24 LAB
BILIRUB BLD-MCNC: NEGATIVE MG/DL
CLARITY, POC: CLEAR
COLOR UR: YELLOW
GLUCOSE UR STRIP-MCNC: NEGATIVE MG/DL
KETONES UR QL: NEGATIVE
LEUKOCYTE EST, POC: NEGATIVE
NITRITE UR-MCNC: NEGATIVE MG/ML
PH UR: 5 [PH] (ref 5–8)
PROT UR STRIP-MCNC: NEGATIVE MG/DL
RBC # UR STRIP: NEGATIVE /UL
SP GR UR: 1 (ref 1–1.03)
UROBILINOGEN UR QL: NORMAL

## 2023-02-24 PROCEDURE — 99386 PREV VISIT NEW AGE 40-64: CPT | Performed by: OBSTETRICS & GYNECOLOGY

## 2023-02-24 PROCEDURE — 81002 URINALYSIS NONAUTO W/O SCOPE: CPT | Performed by: OBSTETRICS & GYNECOLOGY

## 2023-02-24 RX ORDER — BUPROPION HYDROCHLORIDE 150 MG/1
TABLET ORAL
COMMUNITY
Start: 2023-01-25 | End: 2023-02-24

## 2023-02-24 RX ORDER — PHENAZOPYRIDINE HYDROCHLORIDE 200 MG/1
TABLET, FILM COATED ORAL
COMMUNITY
Start: 2023-02-04 | End: 2023-03-28

## 2023-02-24 RX ORDER — LORAZEPAM 0.5 MG/1
TABLET ORAL
COMMUNITY
Start: 2023-02-17

## 2023-02-24 NOTE — PROGRESS NOTES
GYN Annual Exam     CC- Here for annual exam.     Dank Cevallos is a 42 y.o. female who presents for annual well woman exam. Pt is a new pt to me. Pt has hx of TLH in  dye to AUB and failed endometrial ablation. She has not had a pap smear since then. MMG done 2022. . Sexually active.    OB History        1    Para        Term                AB   1    Living           SAB        IAB        Ectopic   1    Molar        Multiple        Live Births                    Current contraception: status post hysterectomy  History of abnormal Pap smear: no  History of abnormal mammogram: no  Family history of uterine, colon or ovarian cancer: yes - maternal GM with ovarain cacner: pt is BRCA negative  Family history of breast cancer: no    Health Maintenance   Topic Date Due   • Annual Gynecologic Pelvic and Breast Exam  Never done   • Pneumococcal Vaccine 0-64 (1 - PCV) Never done   • HEPATITIS C SCREENING  Never done   • ANNUAL PHYSICAL  Never done   • COVID-19 Vaccine (4 - Booster) 2022   • PAP SMEAR  2022   • INFLUENZA VACCINE  2022   • LIPID PANEL  2023   • TDAP/TD VACCINES (2 - Td or Tdap) 2025       Past Medical History:   Diagnosis Date   • Anxiety    • Bladder incontinence    • Colon polyp    • Depression    • GERD (gastroesophageal reflux disease)    • Headache    • Heart murmur    • Hyperlipidemia    • Hypertension    • Irritable bowel syndrome    • Low back pain        Past Surgical History:   Procedure Laterality Date   • COLONOSCOPY      negative   • COLONOSCOPY      polyps   • CYST REMOVAL      On neck   • ENDOMETRIAL ABLATION  2021   • SUBTOTAL HYSTERECTOMY  2021    Partial         Current Outpatient Medications:   •  buPROPion XL (FORFIVO XL) 450 MG 24 hr tablet, Take 450 mg by mouth Daily., Disp: , Rfl:   •  Calcium Carb-Cholecalciferol 600-500 MG-UNIT capsule, Take  by mouth Daily., Disp: , Rfl:   •  LORazepam (ATIVAN) 0.5 MG  tablet, , Disp: , Rfl:   •  multivitamin (THERAGRAN) tablet tablet, Take  by mouth Daily., Disp: , Rfl:   •  nicotine polacrilex (Nicotine Mini) 4 MG lozenge, Dissolve 1 lozenge in the mouth As Needed for Smoking Cessation., Disp: 108 each, Rfl: 3  •  Omega-3 Fatty Acids (fish oil) 1000 MG capsule capsule, Take  by mouth Daily With Breakfast., Disp: , Rfl:   •  omeprazole (priLOSEC) 40 MG capsule, TAKE 1 CAPSULE BY MOUTH ONE TIME DAILY, Disp: 90 capsule, Rfl: 0  •  atorvastatin (LIPITOR) 10 MG tablet, Take 1 tablet by mouth Daily., Disp: 90 tablet, Rfl: 3  •  Coenzyme Q10 (CO Q 10 PO), Take 10 mL by mouth Daily., Disp: , Rfl:   •  phenazopyridine (PYRIDIUM) 200 MG tablet, TAKE 1 TABLET BY MOUTH 1 TO 3 TIMES A DAY AS NEEDED, Disp: , Rfl:   •  TURMERIC PO, Take 20 mL by mouth Every Night., Disp: , Rfl:     Allergies   Allergen Reactions   • Adhesive Tape Rash and Irritability       Social History     Tobacco Use   • Smoking status: Light Smoker     Packs/day: 0.25     Years: 20.00     Pack years: 5.00     Types: Cigarettes   • Smokeless tobacco: Never   Vaping Use   • Vaping Use: Never used   Substance Use Topics   • Alcohol use: Yes     Comment: Social occassion   • Drug use: Never       Family History   Problem Relation Age of Onset   • Bipolar disorder Mother    • Lupus Mother    • No Known Problems Father    • Irritable bowel syndrome Sister    • Fibromyalgia Sister    • No Known Problems Brother    • Hypertension Daughter    • Cancer Maternal Grandmother    • Alcohol abuse Maternal Grandfather    • Hypertension Maternal Grandfather    • Liver cancer Maternal Grandfather    • Diabetes Paternal Grandmother    • Sleep apnea Paternal Grandmother    • No Known Problems Paternal Grandfather        Review of Systems   Constitutional: Negative for appetite change, chills, fatigue, fever and unexpected weight change.   Gastrointestinal: Negative for abdominal distention, abdominal pain, anal bleeding, blood in stool,  "constipation, diarrhea, nausea and vomiting.   Genitourinary: Negative for dyspareunia, dysuria, menstrual problem, pelvic pain, vaginal bleeding, vaginal discharge and vaginal pain.       /78   Ht 175.3 cm (69\")   Wt 98.2 kg (216 lb 9.6 oz)   LMP  (LMP Unknown)   BMI 31.99 kg/m²     Physical Exam  Vitals reviewed.   Constitutional:       General: She is not in acute distress.     Appearance: Normal appearance. She is well-developed. She is not ill-appearing, toxic-appearing or diaphoretic.   HENT:      Mouth/Throat:      Dentition: Normal dentition. No dental caries.   Cardiovascular:      Rate and Rhythm: Normal rate and regular rhythm.      Heart sounds: Normal heart sounds.   Pulmonary:      Effort: Pulmonary effort is normal. No respiratory distress.      Breath sounds: Normal breath sounds. No stridor. No wheezing.   Chest:   Breasts:     Right: No inverted nipple, mass, nipple discharge, skin change or tenderness.      Left: No inverted nipple, mass, nipple discharge, skin change or tenderness.   Abdominal:      General: There is no distension.      Palpations: Abdomen is soft. There is no mass.      Tenderness: There is no abdominal tenderness.   Genitourinary:     General: Normal vulva.      Labia:         Right: No rash, tenderness or lesion.         Left: No rash, tenderness or lesion.       Urethra: No prolapse, urethral pain, urethral swelling or urethral lesion.      Vagina: No vaginal discharge, tenderness or bleeding.      Uterus: Absent.       Adnexa:         Right: No mass, tenderness or fullness.          Left: No mass, tenderness or fullness.        Rectum: No tenderness or external hemorrhoid.   Musculoskeletal:         General: No tenderness. Normal range of motion.   Skin:     General: Skin is warm.      Findings: No erythema or rash.   Neurological:      General: No focal deficit present.      Mental Status: She is alert and oriented to person, place, and time. Mental status is at " baseline.      Cranial Nerves: No cranial nerve deficit.      Motor: No weakness.      Coordination: Coordination normal.      Gait: Gait normal.   Psychiatric:         Mood and Affect: Mood normal.         Behavior: Behavior normal.         Thought Content: Thought content normal.         Judgment: Judgment normal.            Assessment/Plan    1) GYN HM: check pap smear- s/p TLH .   SBE demonstrated and encouraged. MMG done 2022.  2) STD screening: declines  3) Contraception: s/p TLH.   4) Family Planning:  3 step children.  5) Diet and Exercise discussed  6) Smoking Status: quit 2022  7) Social: works at [employer]  8) Follow up prn and 1 year       Diagnoses and all orders for this visit:    Routine gynecological examination  -     POC Urinalysis Dipstick  -     IGP, Apt HPV,rfx 16 / 18,45    Pap smear, low-risk  -     POC Urinalysis Dipstick  -     IGP, Apt HPV,rfx 16 / 18,45    Special screening examination for human papillomavirus (HPV)  -     POC Urinalysis Dipstick  -     IGP, Apt HPV,rfx 16 / 18,45    Other orders  -     LORazepam (ATIVAN) 0.5 MG tablet  -     phenazopyridine (PYRIDIUM) 200 MG tablet; TAKE 1 TABLET BY MOUTH 1 TO 3 TIMES A DAY AS NEEDED  -     Discontinue: buPROPion XL (WELLBUTRIN XL) 150 MG 24 hr tablet          Tiara Hager DO  2023  13:38 EST

## 2023-03-02 LAB
CYTOLOGIST CVX/VAG CYTO: NORMAL
CYTOLOGY CVX/VAG DOC CYTO: NORMAL
CYTOLOGY CVX/VAG DOC THIN PREP: NORMAL
DX ICD CODE: NORMAL
HIV 1 & 2 AB SER-IMP: NORMAL
HPV I/H RISK 4 DNA CVX QL PROBE+SIG AMP: NEGATIVE
OTHER STN SPEC: NORMAL
STAT OF ADQ CVX/VAG CYTO-IMP: NORMAL

## 2023-03-28 ENCOUNTER — OFFICE VISIT (OUTPATIENT)
Dept: FAMILY MEDICINE CLINIC | Facility: CLINIC | Age: 43
End: 2023-03-28
Payer: COMMERCIAL

## 2023-03-28 VITALS
HEIGHT: 69 IN | TEMPERATURE: 98.7 F | WEIGHT: 219 LBS | SYSTOLIC BLOOD PRESSURE: 122 MMHG | HEART RATE: 70 BPM | RESPIRATION RATE: 18 BRPM | BODY MASS INDEX: 32.44 KG/M2 | DIASTOLIC BLOOD PRESSURE: 90 MMHG | OXYGEN SATURATION: 98 %

## 2023-03-28 DIAGNOSIS — K21.9 GASTROESOPHAGEAL REFLUX DISEASE, UNSPECIFIED WHETHER ESOPHAGITIS PRESENT: ICD-10-CM

## 2023-03-28 DIAGNOSIS — Z13.29 SCREENING FOR ENDOCRINE, METABOLIC AND IMMUNITY DISORDER: ICD-10-CM

## 2023-03-28 DIAGNOSIS — F17.219 CIGARETTE NICOTINE DEPENDENCE WITH NICOTINE-INDUCED DISORDER: ICD-10-CM

## 2023-03-28 DIAGNOSIS — E78.2 MIXED HYPERLIPIDEMIA: Primary | ICD-10-CM

## 2023-03-28 DIAGNOSIS — Z13.0 SCREENING FOR ENDOCRINE, METABOLIC AND IMMUNITY DISORDER: ICD-10-CM

## 2023-03-28 DIAGNOSIS — Z13.228 SCREENING FOR ENDOCRINE, METABOLIC AND IMMUNITY DISORDER: ICD-10-CM

## 2023-03-28 PROCEDURE — 85025 COMPLETE CBC W/AUTO DIFF WBC: CPT | Performed by: REGISTERED NURSE

## 2023-03-28 PROCEDURE — 80061 LIPID PANEL: CPT | Performed by: REGISTERED NURSE

## 2023-03-28 PROCEDURE — 80053 COMPREHEN METABOLIC PANEL: CPT | Performed by: REGISTERED NURSE

## 2023-03-28 RX ORDER — POLYETHYLENE GLYCOL 3350 17 G
2 POWDER IN PACKET (EA) ORAL AS NEEDED
Qty: 20 EACH | Refills: 3 | Status: SHIPPED | OUTPATIENT
Start: 2023-03-28

## 2023-03-28 RX ORDER — ZINC SULFATE 50(220)MG
220 CAPSULE ORAL DAILY
COMMUNITY

## 2023-03-28 RX ORDER — OMEPRAZOLE 40 MG/1
40 CAPSULE, DELAYED RELEASE ORAL DAILY
Qty: 90 CAPSULE | Refills: 1 | Status: SHIPPED | OUTPATIENT
Start: 2023-03-28

## 2023-03-28 NOTE — PROGRESS NOTES
Chief Complaint  Follow-up and Hyperlipidemia (Patient is fasting this morning for labs if needed. )    Subjective    History of Present Illness {CC  Problem List  Visit  Diagnosis   Encounters  Notes  Medications  Labs  Result Review Imaging  Media :23}     Dank Cevallos presents to [facility] for Follow-up and Hyperlipidemia (Patient is fasting this morning for labs if needed. ).    History of Present Illness  Patient is a 42-year-old female who presents to the clinic today for 3-month follow-up of hyperlipidemia, GERD, and chronic nicotine dependence.  Denies any chest pain, shortness of breath, or any fevers.  Patient denies any known exposure to COVID, flu, or any other contagious illnesses.    In regards to hyperlipidemia, patient was prescribed atorvastatin.  Patient voices that she is not currently taking the medication as prescribed.  She is trying to further diet and shares that she has started on keto diet approximately 2 months ago.  She feels that this will help and would like to recheck her cholesterol to see how well she is doing.    In regards to GERD, patient is doing well on the increase of 40 mg omeprazole.  She shares that she is trying to decrease the amount of foods that exacerbate her GERD.  Patient denies any other needs at this time in regards to the medication.    In regards to chronic nicotine dependence, patient is interested in smoking cessation today.  We discussed options.  Patient is agreeable to nicotine lozenges.       Review of Systems   Constitutional: Negative.  Negative for activity change, appetite change, chills, diaphoresis, fatigue, fever and unexpected weight change.   HENT: Negative for congestion, dental problem, drooling, ear discharge, ear pain, facial swelling, hearing loss, mouth sores, nosebleeds, postnasal drip, rhinorrhea, sinus pressure, sinus pain, sneezing, sore throat, tinnitus, trouble swallowing and voice change.   "  Eyes: Negative for photophobia, pain, discharge, redness, itching and visual disturbance.   Respiratory: Negative for apnea, cough, choking, chest tightness, shortness of breath, wheezing and stridor.    Cardiovascular: Negative for chest pain, palpitations and leg swelling.   Gastrointestinal: Negative for abdominal distention, abdominal pain, anal bleeding, blood in stool, constipation, diarrhea, nausea, rectal pain and vomiting.   Endocrine: Negative for cold intolerance, heat intolerance, polydipsia, polyphagia and polyuria.   Genitourinary: Negative for decreased urine volume, difficulty urinating, dysuria, enuresis, flank pain, frequency and urgency.   Musculoskeletal: Negative for arthralgias, back pain, gait problem, joint swelling, myalgias, neck pain and neck stiffness.   Skin: Negative for color change, pallor, rash and wound.   Allergic/Immunologic: Negative for environmental allergies, food allergies and immunocompromised state.   Neurological: Negative for dizziness, tremors, seizures, syncope, facial asymmetry, speech difficulty, weakness, light-headedness, numbness and headaches.   Hematological: Negative for adenopathy. Does not bruise/bleed easily.   Psychiatric/Behavioral: Positive for agitation, behavioral problems and confusion. Negative for decreased concentration, dysphoric mood, hallucinations, self-injury, sleep disturbance and suicidal ideas. The patient is not nervous/anxious and is not hyperactive.         Objective     Vital Signs:   /90 (BP Location: Right arm, Patient Position: Sitting, Cuff Size: Adult)   Pulse 70   Temp 98.7 °F (37.1 °C) (Oral)   Resp 18   Ht 175.3 cm (69\")   Wt 99.3 kg (219 lb)   SpO2 98%   BMI 32.34 kg/m²   Current Outpatient Medications on File Prior to Visit   Medication Sig Dispense Refill   • buPROPion XL (FORFIVO XL) 450 MG 24 hr tablet Take 1 tablet by mouth Daily.     • Calcium Carb-Cholecalciferol 600-500 MG-UNIT capsule Take  by mouth Daily. "     • LORazepam (ATIVAN) 0.5 MG tablet      • multivitamin (THERAGRAN) tablet tablet Take  by mouth Daily.     • Omega-3 Fatty Acids (fish oil) 1000 MG capsule capsule Take  by mouth Daily With Breakfast.     • TURMERIC PO Take 20 mL by mouth Every Night.     • zinc sulfate (ZINCATE) 220 (50 Zn) MG capsule Take 1 capsule by mouth Daily.     • atorvastatin (LIPITOR) 10 MG tablet Take 1 tablet by mouth Daily. (Patient not taking: Reported on 3/28/2023) 90 tablet 3     No current facility-administered medications on file prior to visit.        Past Medical History:   Diagnosis Date   • Anxiety    • Bladder incontinence    • Colon polyp    • Depression    • GERD (gastroesophageal reflux disease)    • Headache    • Heart murmur    • Hyperlipidemia    • Hypertension    • Irritable bowel syndrome    • Low back pain       Past Surgical History:   Procedure Laterality Date   • COLONOSCOPY      negative   • COLONOSCOPY      polyps   • CYST REMOVAL      On neck   • ENDOMETRIAL ABLATION  2021   • SUBTOTAL HYSTERECTOMY  2021    Partial      Family History   Problem Relation Age of Onset   • Bipolar disorder Mother    • Lupus Mother    • No Known Problems Father    • Irritable bowel syndrome Sister    • Fibromyalgia Sister    • No Known Problems Brother    • Hypertension Daughter    • Cancer Maternal Grandmother    • Alcohol abuse Maternal Grandfather    • Hypertension Maternal Grandfather    • Liver cancer Maternal Grandfather    • Diabetes Paternal Grandmother    • Sleep apnea Paternal Grandmother    • No Known Problems Paternal Grandfather       Social History     Socioeconomic History   • Marital status:    Tobacco Use   • Smoking status: Former     Packs/day: 0.25     Years: 20.00     Pack years: 5.00     Types: Cigarettes     Quit date: 2022     Years since quittin.6   • Smokeless tobacco: Never   Vaping Use   • Vaping Use: Never used   Substance and Sexual Activity   • Alcohol use: Yes      Comment: Social occassion   • Drug use: Never   • Sexual activity: Yes     Partners: Male         Office Visit on 03/28/2023   Component Date Value Ref Range Status   • Total Cholesterol 03/28/2023 231 (H)  0 - 200 mg/dL Final   • Triglycerides 03/28/2023 90  0 - 150 mg/dL Final   • HDL Cholesterol 03/28/2023 51  40 - 60 mg/dL Final   • LDL Cholesterol  03/28/2023 164 (H)  0 - 100 mg/dL Final   • VLDL Cholesterol 03/28/2023 16  5 - 40 mg/dL Final   • LDL/HDL Ratio 03/28/2023 3.18   Final   • Glucose 03/28/2023 99  65 - 99 mg/dL Final   • BUN 03/28/2023 14  6 - 20 mg/dL Final   • Creatinine 03/28/2023 0.67  0.57 - 1.00 mg/dL Final   • Sodium 03/28/2023 139  136 - 145 mmol/L Final   • Potassium 03/28/2023 4.0  3.5 - 5.2 mmol/L Final   • Chloride 03/28/2023 105  98 - 107 mmol/L Final   • CO2 03/28/2023 26.4  22.0 - 29.0 mmol/L Final   • Calcium 03/28/2023 9.3  8.6 - 10.5 mg/dL Final   • Total Protein 03/28/2023 6.3  6.0 - 8.5 g/dL Final   • Albumin 03/28/2023 3.7  3.5 - 5.2 g/dL Final   • ALT (SGPT) 03/28/2023 19  1 - 33 U/L Final   • AST (SGOT) 03/28/2023 12  1 - 32 U/L Final   • Alkaline Phosphatase 03/28/2023 64  39 - 117 U/L Final   • Total Bilirubin 03/28/2023 0.4  0.0 - 1.2 mg/dL Final   • Globulin 03/28/2023 2.6  gm/dL Final   • A/G Ratio 03/28/2023 1.4  g/dL Final   • BUN/Creatinine Ratio 03/28/2023 20.9  7.0 - 25.0 Final   • Anion Gap 03/28/2023 7.6  5.0 - 15.0 mmol/L Final   • eGFR 03/28/2023 113.5  >60.0 mL/min/1.73 Final   • WBC 03/28/2023 5.38  3.40 - 10.80 10*3/mm3 Final   • RBC 03/28/2023 4.76  3.77 - 5.28 10*6/mm3 Final   • Hemoglobin 03/28/2023 15.1  12.0 - 15.9 g/dL Final   • Hematocrit 03/28/2023 43.6  34.0 - 46.6 % Final   • MCV 03/28/2023 91.6  79.0 - 97.0 fL Final   • MCH 03/28/2023 31.7  26.6 - 33.0 pg Final   • MCHC 03/28/2023 34.6  31.5 - 35.7 g/dL Final   • RDW 03/28/2023 12.4  12.3 - 15.4 % Final   • RDW-SD 03/28/2023 41.3  37.0 - 54.0 fl Final   • MPV 03/28/2023 11.2  6.0 - 12.0 fL Final   •  Platelets 03/28/2023 216  140 - 450 10*3/mm3 Final   • Neutrophil % 03/28/2023 55.3  42.7 - 76.0 % Final   • Lymphocyte % 03/28/2023 32.5  19.6 - 45.3 % Final   • Monocyte % 03/28/2023 8.7  5.0 - 12.0 % Final   • Eosinophil % 03/28/2023 2.2  0.3 - 6.2 % Final   • Basophil % 03/28/2023 0.6  0.0 - 1.5 % Final   • Immature Grans % 03/28/2023 0.7 (H)  0.0 - 0.5 % Final   • Neutrophils, Absolute 03/28/2023 2.97  1.70 - 7.00 10*3/mm3 Final   • Lymphocytes, Absolute 03/28/2023 1.75  0.70 - 3.10 10*3/mm3 Final   • Monocytes, Absolute 03/28/2023 0.47  0.10 - 0.90 10*3/mm3 Final   • Eosinophils, Absolute 03/28/2023 0.12  0.00 - 0.40 10*3/mm3 Final   • Basophils, Absolute 03/28/2023 0.03  0.00 - 0.20 10*3/mm3 Final   • Immature Grans, Absolute 03/28/2023 0.04  0.00 - 0.05 10*3/mm3 Final   • nRBC 03/28/2023 0.0  0.0 - 0.2 /100 WBC Final   Office Visit on 02/24/2023   Component Date Value Ref Range Status   • Color 02/24/2023 Yellow  Yellow, Straw, Dark Yellow, Nila Final   • Clarity, UA 02/24/2023 Clear  Clear Final   • Glucose, UA 02/24/2023 Negative  Negative mg/dL Final   • Bilirubin 02/24/2023 Negative  Negative Final   • Ketones, UA 02/24/2023 Negative  Negative Final   • Specific Gravity  02/24/2023 1.005  1.005 - 1.030 Final   • Blood, UA 02/24/2023 Negative  Negative Final   • pH, Urine 02/24/2023 5.0  5.0 - 8.0 Final   • Protein, POC 02/24/2023 Negative  Negative mg/dL Final   • Urobilinogen, UA 02/24/2023 Normal  Normal, 0.2 E.U./dL Final   • Leukocytes 02/24/2023 Negative  Negative Final   • Nitrite, UA 02/24/2023 Negative  Negative Final   • Diagnosis 02/24/2023 Comment   Final    NEGATIVE FOR INTRAEPITHELIAL LESION OR MALIGNANCY.   • Specimen adequacy: 02/24/2023 Comment   Final    Satisfactory for evaluation. No endocervical component is identified.   • Clinician Provided ICD-10: 02/24/2023 Comment   Final    Comment: Z01.419  Z11.51     • Performed by: 02/24/2023 Comment   Final    Dagmar Hernández,  Cytotechnologist (ASCP)   • . 02/24/2023 .   Final   • Note: 02/24/2023 Comment   Final    Comment: The Pap smear is a screening test designed to aid in the detection of  premalignant and malignant conditions of the uterine cervix.  It is not a  diagnostic procedure and should not be used as the sole means of detecting  cervical cancer.  Both false-positive and false-negative reports do occur.     • Method: 02/24/2023 Comment   Final    Comment: This liquid based ThinPrep(R) pap test was screened with the  use of an image guided system.     • HPV Aptima 02/24/2023 Negative  Negative Final    Comment: This nucleic acid amplification test detects fourteen high-risk  HPV types (16,18,31,33,35,39,45,51,52,56,58,59,66,68) without  differentiation.           Physical Exam  Vitals and nursing note reviewed.   Constitutional:       Appearance: Normal appearance. She is normal weight.   HENT:      Head: Normocephalic and atraumatic.   Cardiovascular:      Rate and Rhythm: Normal rate and regular rhythm.      Pulses: Normal pulses.      Heart sounds: Normal heart sounds. No murmur heard.    No friction rub. No gallop.   Pulmonary:      Effort: Pulmonary effort is normal. No respiratory distress.      Breath sounds: Normal breath sounds. No stridor. No wheezing, rhonchi or rales.   Chest:      Chest wall: No tenderness.   Abdominal:      General: Abdomen is flat. Bowel sounds are normal. There is no distension.      Palpations: Abdomen is soft. There is no mass.      Tenderness: There is no abdominal tenderness. There is no right CVA tenderness, left CVA tenderness, guarding or rebound.      Hernia: No hernia is present.   Skin:     General: Skin is warm and dry.      Capillary Refill: Capillary refill takes less than 2 seconds.      Coloration: Skin is not jaundiced or pale.   Neurological:      General: No focal deficit present.      Mental Status: She is alert and oriented to person, place, and time. Mental status is at  baseline.      Motor: No weakness.      Coordination: Coordination normal.      Gait: Gait normal.   Psychiatric:         Mood and Affect: Mood normal.         Behavior: Behavior normal.         Thought Content: Thought content normal.         Judgment: Judgment normal.          Result Review  Data Reviewed:{ Labs  Result Review  Imaging  Med Tab  Media :23}   I have reviewed this patient's chart.  I have reviewed previous labs, previous imaging, previous medications, and previous encounters with notes that were available in this patient's chart.             Assessment and Plan {CC Problem List  Visit Diagnosis  ROS  Review (Popup)  [facility]  Quality  BestPractice  Medications  SmartSets  SnapShot Encounters  Media :23}   Diagnoses and all orders for this visit:    1. Mixed hyperlipidemia (Primary)  -     Lipid panel  -     omeprazole (priLOSEC) 40 MG capsule; Take 1 capsule by mouth Daily.  Dispense: 90 capsule; Refill: 1    2. Gastroesophageal reflux disease, unspecified whether esophagitis present  -     CBC w AUTO Differential  -     Comprehensive metabolic panel    3. Screening for endocrine, metabolic and immunity disorder  -     CBC w AUTO Differential  -     Comprehensive metabolic panel    4. Cigarette nicotine dependence with nicotine-induced disorder  -     nicotine polacrilex (Nicotine Mini) 2 MG lozenge; Dissolve 1 lozenge in the mouth As Needed for Smoking Cessation.  Dispense: 20 each; Refill: 3      -Fasting labs today.  -Discussed starting atorvastatin if patient is not able to maintain a low-cholesterol on dieting.  -ER red flags discussed with patient.  -Patient is going to trial nicotine lozenges for smoking cessation.  -Follow-up in 3 months or sooner if needed.    I spent 40 minutes caring for Dank on this date of service. This time includes time spent by me in the following activities:preparing for the visit, reviewing tests, obtaining and/or reviewing a separately  obtained history, performing a medically appropriate examination and/or evaluation , counseling and educating the patient/family/caregiver, ordering medications, tests, or procedures, referring and communicating with other health care professionals , documenting information in the medical record, independently interpreting results and communicating that information with the patient/family/caregiver and care coordination.    Follow Up {Instructions Charge Capture  Follow-up Communications :23}     Patient was given instructions and counseling regarding her condition or for health maintenance advice. Please see specific information pulled into the AVS (placed there by myself) if appropriate.    Return in about 3 months (around 6/28/2023) for Recheck.    MDM  Number of Diagnoses or Management Options  Cigarette nicotine dependence with nicotine-induced disorder: established, worsening  Gastroesophageal reflux disease, unspecified whether esophagitis present: established, improving  Mixed hyperlipidemia: established, improving  Screening for endocrine, metabolic and immunity disorder: new, needed workup     Amount and/or Complexity of Data Reviewed  Clinical lab tests: ordered and reviewed  Tests in the radiology section of CPT®: reviewed  Tests in the medicine section of CPT®: reviewed  Discussion of test results with the performing providers: no  Decide to obtain previous medical records or to obtain history from someone other than the patient: no  Obtain history from someone other than the patient: no  Review and summarize past medical records: yes  Discuss the patient with other providers: no  Independent visualization of images, tracings, or specimens: no    Risk of Complications, Morbidity, and/or Mortality  Presenting problems: moderate  Diagnostic procedures: low  Management options: moderate    Patient Progress  Patient progress: stable       BMI is >= 30 and <35. (Class 1 Obesity). The following options  were offered after discussion;: exercise counseling/recommendations and nutrition counseling/recommendations       KEENA Mccarthy, FNP-BC

## 2023-03-29 LAB
ALBUMIN SERPL-MCNC: 3.7 G/DL (ref 3.5–5.2)
ALBUMIN/GLOB SERPL: 1.4 G/DL
ALP SERPL-CCNC: 64 U/L (ref 39–117)
ALT SERPL W P-5'-P-CCNC: 19 U/L (ref 1–33)
ANION GAP SERPL CALCULATED.3IONS-SCNC: 7.6 MMOL/L (ref 5–15)
AST SERPL-CCNC: 12 U/L (ref 1–32)
BASOPHILS # BLD AUTO: 0.03 10*3/MM3 (ref 0–0.2)
BASOPHILS NFR BLD AUTO: 0.6 % (ref 0–1.5)
BILIRUB SERPL-MCNC: 0.4 MG/DL (ref 0–1.2)
BUN SERPL-MCNC: 14 MG/DL (ref 6–20)
BUN/CREAT SERPL: 20.9 (ref 7–25)
CALCIUM SPEC-SCNC: 9.3 MG/DL (ref 8.6–10.5)
CHLORIDE SERPL-SCNC: 105 MMOL/L (ref 98–107)
CHOLEST SERPL-MCNC: 231 MG/DL (ref 0–200)
CO2 SERPL-SCNC: 26.4 MMOL/L (ref 22–29)
CREAT SERPL-MCNC: 0.67 MG/DL (ref 0.57–1)
DEPRECATED RDW RBC AUTO: 41.3 FL (ref 37–54)
EGFRCR SERPLBLD CKD-EPI 2021: 113.5 ML/MIN/1.73
EOSINOPHIL # BLD AUTO: 0.12 10*3/MM3 (ref 0–0.4)
EOSINOPHIL NFR BLD AUTO: 2.2 % (ref 0.3–6.2)
ERYTHROCYTE [DISTWIDTH] IN BLOOD BY AUTOMATED COUNT: 12.4 % (ref 12.3–15.4)
GLOBULIN UR ELPH-MCNC: 2.6 GM/DL
GLUCOSE SERPL-MCNC: 99 MG/DL (ref 65–99)
HCT VFR BLD AUTO: 43.6 % (ref 34–46.6)
HDLC SERPL-MCNC: 51 MG/DL (ref 40–60)
HGB BLD-MCNC: 15.1 G/DL (ref 12–15.9)
IMM GRANULOCYTES # BLD AUTO: 0.04 10*3/MM3 (ref 0–0.05)
IMM GRANULOCYTES NFR BLD AUTO: 0.7 % (ref 0–0.5)
LDLC SERPL CALC-MCNC: 164 MG/DL (ref 0–100)
LDLC/HDLC SERPL: 3.18 {RATIO}
LYMPHOCYTES # BLD AUTO: 1.75 10*3/MM3 (ref 0.7–3.1)
LYMPHOCYTES NFR BLD AUTO: 32.5 % (ref 19.6–45.3)
MCH RBC QN AUTO: 31.7 PG (ref 26.6–33)
MCHC RBC AUTO-ENTMCNC: 34.6 G/DL (ref 31.5–35.7)
MCV RBC AUTO: 91.6 FL (ref 79–97)
MONOCYTES # BLD AUTO: 0.47 10*3/MM3 (ref 0.1–0.9)
MONOCYTES NFR BLD AUTO: 8.7 % (ref 5–12)
NEUTROPHILS NFR BLD AUTO: 2.97 10*3/MM3 (ref 1.7–7)
NEUTROPHILS NFR BLD AUTO: 55.3 % (ref 42.7–76)
NRBC BLD AUTO-RTO: 0 /100 WBC (ref 0–0.2)
PLATELET # BLD AUTO: 216 10*3/MM3 (ref 140–450)
PMV BLD AUTO: 11.2 FL (ref 6–12)
POTASSIUM SERPL-SCNC: 4 MMOL/L (ref 3.5–5.2)
PROT SERPL-MCNC: 6.3 G/DL (ref 6–8.5)
RBC # BLD AUTO: 4.76 10*6/MM3 (ref 3.77–5.28)
SODIUM SERPL-SCNC: 139 MMOL/L (ref 136–145)
TRIGL SERPL-MCNC: 90 MG/DL (ref 0–150)
VLDLC SERPL-MCNC: 16 MG/DL (ref 5–40)
WBC NRBC COR # BLD: 5.38 10*3/MM3 (ref 3.4–10.8)

## 2023-06-07 DIAGNOSIS — F17.219 CIGARETTE NICOTINE DEPENDENCE WITH NICOTINE-INDUCED DISORDER: ICD-10-CM

## 2023-06-07 RX ORDER — NICOTINE 2 MG/1
LOZENGE ORAL
Qty: 270 LOZENGE | Refills: 0 | Status: SHIPPED | OUTPATIENT
Start: 2023-06-07

## 2023-11-14 DIAGNOSIS — F17.219 CIGARETTE NICOTINE DEPENDENCE WITH NICOTINE-INDUCED DISORDER: ICD-10-CM

## 2023-11-14 RX ORDER — NICOTINE 2 MG/1
LOZENGE ORAL
Qty: 270 LOZENGE | Refills: 0 | Status: SHIPPED | OUTPATIENT
Start: 2023-11-14

## 2024-02-08 DIAGNOSIS — F17.219 CIGARETTE NICOTINE DEPENDENCE WITH NICOTINE-INDUCED DISORDER: ICD-10-CM

## 2024-02-09 RX ORDER — NICOTINE 2 MG/1
LOZENGE ORAL
Qty: 270 LOZENGE | Refills: 0 | Status: SHIPPED | OUTPATIENT
Start: 2024-02-09

## 2024-02-21 DIAGNOSIS — E78.2 MIXED HYPERLIPIDEMIA: ICD-10-CM

## 2024-02-21 RX ORDER — OMEPRAZOLE 40 MG/1
CAPSULE, DELAYED RELEASE ORAL
Qty: 90 CAPSULE | Refills: 0 | Status: SHIPPED | OUTPATIENT
Start: 2024-02-21

## 2024-03-11 DIAGNOSIS — F17.219 CIGARETTE NICOTINE DEPENDENCE WITH NICOTINE-INDUCED DISORDER: ICD-10-CM

## 2024-03-11 NOTE — TELEPHONE ENCOUNTER
Rx Refill Note  Requested Prescriptions     Pending Prescriptions Disp Refills    nicotine polacrilex (KLS Quit2) 2 MG lozenge 270 lozenge 0     Sig: Dissolve 1 lozenge in the mouth As Needed for Smoking Cessation.      Last office visit with prescribing clinician: 7/11/2023   Last telemedicine visit with prescribing clinician: Visit date not found   Next office visit with prescribing clinician: 3/12/2024       Raleigh Helm,  My insurance will not cover the lozenges at [facility] anymore but apparently will at [facility]. If you could do a prescription for 2mg and [facility] [address] in [place], I would appreciate it.               Yane Cunningham MA  03/11/24, 13:25 EDT

## 2024-03-12 ENCOUNTER — OFFICE VISIT (OUTPATIENT)
Dept: FAMILY MEDICINE CLINIC | Facility: CLINIC | Age: 44
End: 2024-03-12
Payer: COMMERCIAL

## 2024-03-12 VITALS
RESPIRATION RATE: 18 BRPM | HEIGHT: 69 IN | DIASTOLIC BLOOD PRESSURE: 84 MMHG | SYSTOLIC BLOOD PRESSURE: 110 MMHG | HEART RATE: 73 BPM | BODY MASS INDEX: 33.18 KG/M2 | OXYGEN SATURATION: 95 % | WEIGHT: 224 LBS | TEMPERATURE: 98.4 F

## 2024-03-12 DIAGNOSIS — Z13.0 SCREENING FOR ENDOCRINE, METABOLIC AND IMMUNITY DISORDER: ICD-10-CM

## 2024-03-12 DIAGNOSIS — Z13.29 SCREENING FOR ENDOCRINE, METABOLIC AND IMMUNITY DISORDER: ICD-10-CM

## 2024-03-12 DIAGNOSIS — R53.83 FATIGUE, UNSPECIFIED TYPE: ICD-10-CM

## 2024-03-12 DIAGNOSIS — R03.0 ELEVATED BLOOD PRESSURE READING: ICD-10-CM

## 2024-03-12 DIAGNOSIS — Z13.228 SCREENING FOR ENDOCRINE, METABOLIC AND IMMUNITY DISORDER: ICD-10-CM

## 2024-03-12 DIAGNOSIS — Z87.19 HISTORY OF HIATAL HERNIA: ICD-10-CM

## 2024-03-12 DIAGNOSIS — K21.9 GASTROESOPHAGEAL REFLUX DISEASE, UNSPECIFIED WHETHER ESOPHAGITIS PRESENT: Primary | ICD-10-CM

## 2024-03-12 DIAGNOSIS — Z13.1 SCREENING FOR DIABETES MELLITUS: ICD-10-CM

## 2024-03-12 DIAGNOSIS — E78.2 MIXED HYPERLIPIDEMIA: ICD-10-CM

## 2024-03-12 PROCEDURE — 99214 OFFICE O/P EST MOD 30 MIN: CPT | Performed by: REGISTERED NURSE

## 2024-03-12 RX ORDER — VIBEGRON 75 MG/1
75 TABLET, FILM COATED ORAL DAILY
COMMUNITY
Start: 2024-02-26

## 2024-03-12 RX ORDER — OMEPRAZOLE 40 MG/1
40 CAPSULE, DELAYED RELEASE ORAL DAILY
Qty: 90 CAPSULE | Refills: 3 | Status: SHIPPED | OUTPATIENT
Start: 2024-03-12

## 2024-03-12 RX ORDER — POLYETHYLENE GLYCOL 3350 17 G
2 POWDER IN PACKET (EA) ORAL AS NEEDED
Qty: 270 LOZENGE | Refills: 0 | Status: SHIPPED | OUTPATIENT
Start: 2024-03-12

## 2024-03-12 NOTE — PROGRESS NOTES
Chief Complaint  Follow-up and Hyperlipidemia (Patient is wanting cholesterol checked. )    Subjective    History of Present Illness {CC  Problem List  Visit  Diagnosis   Encounters  Notes  Medications  Labs  Result Review Imaging  Media :23}     Dank Cevallos presents to [facility] for Follow-up and Hyperlipidemia (Patient is wanting cholesterol checked. ).      History of Present Illness  Patient is a 43 y.o. female  presents to the clinic today for 3-month follow-up for GERD, hyperlipidemia, and elevated blood pressure.  Patient denies any chest pain, shortness of breath, or any fevers.  Patient denies any known exposure to COVID, flu, or any other contagious illnesses.      In regards to GERD, patient is currently taking omeprazole 40 mg.  Patient shares that omeprazole is working well for her GERD.  Patient denies any concerns in regards to her GERD or omeprazole at this time.    In regards to hyperlipidemia, patient is currently taking atorvastatin to manage this.  Patient is encouraged to follow low-fat diet when possible.  Patient denies any issues with her hyperlipidemia or atorvastatin at this time.    In regards to elevated blood pressure, blood pressure in office today is 160/100.  Patient stated she has taken Lisinopril in the past but is not currently taking.  Patient is advised to keep a log of her blood pressures at home.  If her home blood pressure is consistently staying above 100 then I would highly recommend she restart lisinopril.  Upon recheck patient's blood pressure was 110/84.    Patient does report that she is seeing a pelvic  for vaginal and rectal prolapse.  Reports having a positive effect from the exercises.             Review of Systems   Constitutional: Negative.  Negative for activity change, chills, fatigue and fever.   HENT: Negative.  Negative for congestion, dental problem, ear pain, hearing loss, rhinorrhea, sinus pain,  "sore throat, tinnitus and trouble swallowing.    Eyes: Negative.  Negative for pain and visual disturbance.   Respiratory: Negative.  Negative for cough, chest tightness, shortness of breath and wheezing.    Cardiovascular: Negative.  Negative for chest pain, palpitations and leg swelling.   Gastrointestinal: Negative.  Negative for abdominal pain, diarrhea, nausea and vomiting.   Endocrine: Negative.  Negative for polydipsia, polyphagia and polyuria.   Genitourinary: Negative.  Negative for difficulty urinating, dysuria, frequency and urgency.   Musculoskeletal: Negative.  Negative for arthralgias, back pain and myalgias.   Skin: Negative.  Negative for color change, pallor, rash and wound.   Allergic/Immunologic: Negative.  Negative for environmental allergies.   Neurological: Negative.  Negative for dizziness, speech difficulty, weakness, light-headedness, numbness and headaches.   Hematological: Negative.    Psychiatric/Behavioral: Negative.  Negative for confusion, decreased concentration, self-injury and suicidal ideas. The patient is not nervous/anxious.    All other systems reviewed and are negative.       Objective     Vital Signs:   /84   Pulse 73   Temp 98.4 °F (36.9 °C) (Temporal)   Resp 18   Ht 175.3 cm (69\")   Wt 102 kg (224 lb)   SpO2 95%   BMI 33.08 kg/m²   Current Outpatient Medications on File Prior to Visit   Medication Sig Dispense Refill    atorvastatin (LIPITOR) 10 MG tablet Take 1 tablet by mouth Daily. 90 tablet 3    buPROPion XL (FORFIVO XL) 450 MG 24 hr tablet Take 300 mg by mouth Daily.      Calcium Carb-Cholecalciferol 600-500 MG-UNIT capsule Take  by mouth Daily.      Gemtesa 75 MG tablet Take 1 tablet by mouth Daily.      LORazepam (ATIVAN) 0.5 MG tablet       multivitamin (THERAGRAN) tablet tablet Take  by mouth Daily.      Omega-3 Fatty Acids (fish oil) 1000 MG capsule capsule Take  by mouth Daily With Breakfast.      nicotine polacrilex (KLS Quit2) 2 MG lozenge Dissolve " 1 lozenge in the mouth As Needed for Smoking Cessation. 270 lozenge 0    [DISCONTINUED] sulfamethoxazole-trimethoprim (Bactrim DS) 800-160 MG per tablet Take 1 tablet by mouth 2 (Two) Times a Day. (Patient not taking: Reported on 3/12/2024) 20 tablet 0    [DISCONTINUED] TURMERIC PO Take 20 mL by mouth Every Night. (Patient not taking: Reported on 3/12/2024)      [DISCONTINUED] zinc sulfate (ZINCATE) 220 (50 Zn) MG capsule Take 1 capsule by mouth Daily. (Patient not taking: Reported on 2023)       No current facility-administered medications on file prior to visit.        Past Medical History:   Diagnosis Date    Anxiety     Bladder incontinence     Colon polyp     Depression     GERD (gastroesophageal reflux disease)     Headache     Heart murmur     Hyperlipidemia     Hypertension     Irritable bowel syndrome     Low back pain       Past Surgical History:   Procedure Laterality Date    COLONOSCOPY      negative    COLONOSCOPY  2006    polyps    CYST REMOVAL      On neck    ENDOMETRIAL ABLATION  2021    SUBTOTAL HYSTERECTOMY  2021    Partial      Family History   Problem Relation Age of Onset    Bipolar disorder Mother     Lupus Mother     No Known Problems Father     Irritable bowel syndrome Sister     Fibromyalgia Sister     No Known Problems Brother     Hypertension Daughter     Cancer Maternal Grandmother     Alcohol abuse Maternal Grandfather     Hypertension Maternal Grandfather     Liver cancer Maternal Grandfather     Diabetes Paternal Grandmother     Sleep apnea Paternal Grandmother     No Known Problems Paternal Grandfather       Social History     Socioeconomic History    Marital status:    Tobacco Use    Smoking status: Former     Current packs/day: 0.00     Average packs/day: 0.3 packs/day for 20.0 years (5.0 ttl pk-yrs)     Types: Cigarettes     Start date: 2002     Quit date: 2022     Years since quittin.5    Smokeless tobacco: Never   Vaping Use    Vaping  status: Never Used   Substance and Sexual Activity    Alcohol use: Yes     Comment: Social occassion    Drug use: Never    Sexual activity: Yes     Partners: Male         No visits with results within 3 Month(s) from this visit.   Latest known visit with results is:   Office Visit on 07/11/2023   Component Date Value Ref Range Status    Hemoglobin A1C 07/11/2023 5.30  4.80 - 5.60 % Final    Glucose 07/11/2023 104 (H)  65 - 99 mg/dL Final    BUN 07/11/2023 15  6 - 20 mg/dL Final    Creatinine 07/11/2023 0.71  0.57 - 1.00 mg/dL Final    Sodium 07/11/2023 138  136 - 145 mmol/L Final    Potassium 07/11/2023 4.0  3.5 - 5.2 mmol/L Final    Chloride 07/11/2023 105  98 - 107 mmol/L Final    CO2 07/11/2023 22.4  22.0 - 29.0 mmol/L Final    Calcium 07/11/2023 8.9  8.6 - 10.5 mg/dL Final    Total Protein 07/11/2023 6.4  6.0 - 8.5 g/dL Final    Albumin 07/11/2023 3.9  3.5 - 5.2 g/dL Final    ALT (SGPT) 07/11/2023 44 (H)  1 - 33 U/L Final    AST (SGOT) 07/11/2023 29  1 - 32 U/L Final    Alkaline Phosphatase 07/11/2023 83  39 - 117 U/L Final    Total Bilirubin 07/11/2023 0.3  0.0 - 1.2 mg/dL Final    Globulin 07/11/2023 2.5  gm/dL Final    A/G Ratio 07/11/2023 1.6  g/dL Final    BUN/Creatinine Ratio 07/11/2023 21.1  7.0 - 25.0 Final    Anion Gap 07/11/2023 10.6  5.0 - 15.0 mmol/L Final    eGFR 07/11/2023 110.4  >60.0 mL/min/1.73 Final    Total Cholesterol 07/11/2023 256 (H)  0 - 200 mg/dL Final    Triglycerides 07/11/2023 166 (H)  0 - 150 mg/dL Final    HDL Cholesterol 07/11/2023 51  40 - 60 mg/dL Final    LDL Cholesterol  07/11/2023 174 (H)  0 - 100 mg/dL Final    VLDL Cholesterol 07/11/2023 31  5 - 40 mg/dL Final    LDL/HDL Ratio 07/11/2023 3.37   Final    TSH 07/11/2023 1.470  0.270 - 4.200 uIU/mL Final    T3, Total 07/11/2023 122.0  80.0 - 200.0 ng/dl Final    Free T4 07/11/2023 0.88 (L)  0.93 - 1.70 ng/dL Final    FSH 07/11/2023 9.41  mIU/mL Final    LH 07/11/2023 6.86  mIU/mL Final    Prolactin 07/11/2023 14.50  4.79 -  23.30 ng/mL Final    Testosterone, Free 07/11/2023 0.6  0.0 - 4.2 pg/mL Final    WBC 07/11/2023 5.08  3.40 - 10.80 10*3/mm3 Final    RBC 07/11/2023 4.87  3.77 - 5.28 10*6/mm3 Final    Hemoglobin 07/11/2023 15.2  12.0 - 15.9 g/dL Final    Hematocrit 07/11/2023 44.1  34.0 - 46.6 % Final    MCV 07/11/2023 90.6  79.0 - 97.0 fL Final    MCH 07/11/2023 31.2  26.6 - 33.0 pg Final    MCHC 07/11/2023 34.5  31.5 - 35.7 g/dL Final    RDW 07/11/2023 12.9  12.3 - 15.4 % Final    RDW-SD 07/11/2023 42.2  37.0 - 54.0 fl Final    MPV 07/11/2023 11.3  6.0 - 12.0 fL Final    Platelets 07/11/2023 200  140 - 450 10*3/mm3 Final    Neutrophil % 07/11/2023 66.6  42.7 - 76.0 % Final    Lymphocyte % 07/11/2023 21.1  19.6 - 45.3 % Final    Monocyte % 07/11/2023 8.7  5.0 - 12.0 % Final    Eosinophil % 07/11/2023 1.8  0.3 - 6.2 % Final    Basophil % 07/11/2023 0.6  0.0 - 1.5 % Final    Immature Grans % 07/11/2023 1.2 (H)  0.0 - 0.5 % Final    Neutrophils, Absolute 07/11/2023 3.39  1.70 - 7.00 10*3/mm3 Final    Lymphocytes, Absolute 07/11/2023 1.07  0.70 - 3.10 10*3/mm3 Final    Monocytes, Absolute 07/11/2023 0.44  0.10 - 0.90 10*3/mm3 Final    Eosinophils, Absolute 07/11/2023 0.09  0.00 - 0.40 10*3/mm3 Final    Basophils, Absolute 07/11/2023 0.03  0.00 - 0.20 10*3/mm3 Final    Immature Grans, Absolute 07/11/2023 0.06 (H)  0.00 - 0.05 10*3/mm3 Final    nRBC 07/11/2023 0.0  0.0 - 0.2 /100 WBC Final         Physical Exam  Vitals and nursing note reviewed.   Constitutional:       Appearance: Normal appearance. She is normal weight.   HENT:      Head: Normocephalic and atraumatic.   Cardiovascular:      Rate and Rhythm: Normal rate and regular rhythm.      Pulses: Normal pulses.      Heart sounds: Normal heart sounds. No murmur heard.     No friction rub. No gallop.   Pulmonary:      Effort: Pulmonary effort is normal. No respiratory distress.      Breath sounds: Normal breath sounds. No stridor. No wheezing, rhonchi or rales.   Chest:      Chest  wall: No tenderness.   Abdominal:      General: Abdomen is flat. Bowel sounds are normal. There is no distension.      Palpations: Abdomen is soft. There is no mass.      Tenderness: There is no abdominal tenderness. There is no right CVA tenderness, left CVA tenderness, guarding or rebound.      Hernia: No hernia is present.   Skin:     General: Skin is warm and dry.      Capillary Refill: Capillary refill takes less than 2 seconds.      Coloration: Skin is not jaundiced or pale.   Neurological:      General: No focal deficit present.      Mental Status: She is alert and oriented to person, place, and time. Mental status is at baseline.      Motor: No weakness.      Coordination: Coordination normal.      Gait: Gait normal.   Psychiatric:         Mood and Affect: Mood normal.         Behavior: Behavior normal.         Thought Content: Thought content normal.         Judgment: Judgment normal.          Result Review  Data Reviewed:{ Labs  Result Review  Imaging  Med Tab  Media :23}   I have reviewed this patient's chart.  I have reviewed previous labs, previous imaging, previous medications, and previous encounters with notes that were available in this patient's chart.               Assessment and Plan {CC Problem List  Visit Diagnosis  ROS  Review (Popup)  [facility]  Quality  BestPractice  Medications  SmartSets  SnapShot Encounters  Media :23}   Diagnoses and all orders for this visit:    1. Gastroesophageal reflux disease, unspecified whether esophagitis present (Primary)  -     omeprazole (priLOSEC) 40 MG capsule; Take 1 capsule by mouth Daily.  Dispense: 90 capsule; Refill: 3    2. Mixed hyperlipidemia  -     Comprehensive metabolic panel; Future  -     CBC w AUTO Differential; Future  -     Lipid panel; Future  -     omeprazole (priLOSEC) 40 MG capsule; Take 1 capsule by mouth Daily.  Dispense: 90 capsule; Refill: 3    3. Screening for diabetes mellitus  -     Hemoglobin A1c;  Future    4. Screening for endocrine, metabolic and immunity disorder  -     Hemoglobin A1c; Future    5. History of hiatal hernia  -     omeprazole (priLOSEC) 40 MG capsule; Take 1 capsule by mouth Daily.  Dispense: 90 capsule; Refill: 3    6. Fatigue, unspecified type  -     Vitamin D 25 hydroxy; Future    7. Elevated blood pressure reading        -Fasting labs tomorrow or when patient is next available.  -ER red flags discussed with patient including risk versus benefit and education provided.  -Follow-up with me in 3 months or sooner if needed.    I spent 30 minutes caring for Dank on this date of service. This time includes time spent by me in the following activities:preparing for the visit, reviewing tests, obtaining and/or reviewing a separately obtained history, performing a medically appropriate examination and/or evaluation , counseling and educating the patient/family/caregiver, ordering medications, tests, or procedures, referring and communicating with other health care professionals , documenting information in the medical record, independently interpreting results and communicating that information with the patient/family/caregiver, and care coordination.    Follow Up {Instructions Charge Capture  Follow-up Communications :23}     Patient was given instructions and counseling regarding her condition or for health maintenance advice. Please see specific information pulled into the AVS (placed there by myself) if appropriate.    Return in about 3 months (around 6/12/2024) for routine follow up.    BMI is >= 30 and <35. (Class 1 Obesity). The following options were offered after discussion;: exercise counseling/recommendations and nutrition counseling/recommendations       KEENA Mccarthy, FNP-BC

## 2024-03-21 ENCOUNTER — LAB (OUTPATIENT)
Dept: LAB | Facility: HOSPITAL | Age: 44
End: 2024-03-21
Payer: COMMERCIAL

## 2024-03-21 DIAGNOSIS — Z13.29 SCREENING FOR ENDOCRINE, METABOLIC AND IMMUNITY DISORDER: ICD-10-CM

## 2024-03-21 DIAGNOSIS — E78.2 MIXED HYPERLIPIDEMIA: ICD-10-CM

## 2024-03-21 DIAGNOSIS — Z13.0 SCREENING FOR ENDOCRINE, METABOLIC AND IMMUNITY DISORDER: ICD-10-CM

## 2024-03-21 DIAGNOSIS — Z13.228 SCREENING FOR ENDOCRINE, METABOLIC AND IMMUNITY DISORDER: ICD-10-CM

## 2024-03-21 DIAGNOSIS — R53.83 FATIGUE, UNSPECIFIED TYPE: ICD-10-CM

## 2024-03-21 DIAGNOSIS — Z13.1 SCREENING FOR DIABETES MELLITUS: ICD-10-CM

## 2024-03-21 LAB
25(OH)D3 SERPL-MCNC: 59.6 NG/ML (ref 30–100)
ALBUMIN SERPL-MCNC: 3.8 G/DL (ref 3.5–5.2)
ALBUMIN/GLOB SERPL: 1.6 G/DL
ALP SERPL-CCNC: 77 U/L (ref 39–117)
ALT SERPL W P-5'-P-CCNC: 21 U/L (ref 1–33)
ANION GAP SERPL CALCULATED.3IONS-SCNC: 10 MMOL/L (ref 5–15)
AST SERPL-CCNC: 10 U/L (ref 1–32)
BASOPHILS # BLD AUTO: 0.04 10*3/MM3 (ref 0–0.2)
BASOPHILS NFR BLD AUTO: 0.6 % (ref 0–1.5)
BILIRUB SERPL-MCNC: 0.4 MG/DL (ref 0–1.2)
BUN SERPL-MCNC: 13 MG/DL (ref 6–20)
BUN/CREAT SERPL: 18.1 (ref 7–25)
CALCIUM SPEC-SCNC: 9.4 MG/DL (ref 8.6–10.5)
CHLORIDE SERPL-SCNC: 103 MMOL/L (ref 98–107)
CHOLEST SERPL-MCNC: 203 MG/DL (ref 0–200)
CO2 SERPL-SCNC: 25 MMOL/L (ref 22–29)
CREAT SERPL-MCNC: 0.72 MG/DL (ref 0.57–1)
DEPRECATED RDW RBC AUTO: 42.6 FL (ref 37–54)
EGFRCR SERPLBLD CKD-EPI 2021: 107.9 ML/MIN/1.73
EOSINOPHIL # BLD AUTO: 0.1 10*3/MM3 (ref 0–0.4)
EOSINOPHIL NFR BLD AUTO: 1.4 % (ref 0.3–6.2)
ERYTHROCYTE [DISTWIDTH] IN BLOOD BY AUTOMATED COUNT: 12.5 % (ref 12.3–15.4)
GLOBULIN UR ELPH-MCNC: 2.4 GM/DL
GLUCOSE SERPL-MCNC: 112 MG/DL (ref 65–99)
HBA1C MFR BLD: 5.2 % (ref 4.8–5.6)
HCT VFR BLD AUTO: 45.5 % (ref 34–46.6)
HDLC SERPL-MCNC: 39 MG/DL (ref 40–60)
HGB BLD-MCNC: 15.6 G/DL (ref 12–15.9)
IMM GRANULOCYTES # BLD AUTO: 0.07 10*3/MM3 (ref 0–0.05)
IMM GRANULOCYTES NFR BLD AUTO: 1 % (ref 0–0.5)
LDLC SERPL CALC-MCNC: 140 MG/DL (ref 0–100)
LDLC/HDLC SERPL: 3.53 {RATIO}
LYMPHOCYTES # BLD AUTO: 2.05 10*3/MM3 (ref 0.7–3.1)
LYMPHOCYTES NFR BLD AUTO: 28.2 % (ref 19.6–45.3)
MCH RBC QN AUTO: 31.8 PG (ref 26.6–33)
MCHC RBC AUTO-ENTMCNC: 34.3 G/DL (ref 31.5–35.7)
MCV RBC AUTO: 92.9 FL (ref 79–97)
MONOCYTES # BLD AUTO: 0.7 10*3/MM3 (ref 0.1–0.9)
MONOCYTES NFR BLD AUTO: 9.6 % (ref 5–12)
NEUTROPHILS NFR BLD AUTO: 4.3 10*3/MM3 (ref 1.7–7)
NEUTROPHILS NFR BLD AUTO: 59.2 % (ref 42.7–76)
NRBC BLD AUTO-RTO: 0 /100 WBC (ref 0–0.2)
PLATELET # BLD AUTO: 229 10*3/MM3 (ref 140–450)
PMV BLD AUTO: 11.2 FL (ref 6–12)
POTASSIUM SERPL-SCNC: 4.2 MMOL/L (ref 3.5–5.2)
PROT SERPL-MCNC: 6.2 G/DL (ref 6–8.5)
RBC # BLD AUTO: 4.9 10*6/MM3 (ref 3.77–5.28)
SODIUM SERPL-SCNC: 138 MMOL/L (ref 136–145)
TRIGL SERPL-MCNC: 132 MG/DL (ref 0–150)
VLDLC SERPL-MCNC: 24 MG/DL (ref 5–40)
WBC NRBC COR # BLD AUTO: 7.26 10*3/MM3 (ref 3.4–10.8)

## 2024-03-21 PROCEDURE — 36415 COLL VENOUS BLD VENIPUNCTURE: CPT

## 2024-03-21 PROCEDURE — 80053 COMPREHEN METABOLIC PANEL: CPT

## 2024-03-21 PROCEDURE — 83036 HEMOGLOBIN GLYCOSYLATED A1C: CPT

## 2024-03-21 PROCEDURE — 80061 LIPID PANEL: CPT

## 2024-03-21 PROCEDURE — 85025 COMPLETE CBC W/AUTO DIFF WBC: CPT

## 2024-03-21 PROCEDURE — 82306 VITAMIN D 25 HYDROXY: CPT

## 2024-05-02 ENCOUNTER — OFFICE VISIT (OUTPATIENT)
Dept: ORTHOPEDIC SURGERY | Facility: CLINIC | Age: 44
End: 2024-05-02
Payer: COMMERCIAL

## 2024-05-02 VITALS
DIASTOLIC BLOOD PRESSURE: 78 MMHG | HEART RATE: 80 BPM | HEIGHT: 69 IN | SYSTOLIC BLOOD PRESSURE: 126 MMHG | BODY MASS INDEX: 33.18 KG/M2 | WEIGHT: 224 LBS

## 2024-05-02 DIAGNOSIS — M25.561 ACUTE PAIN OF BOTH KNEES: ICD-10-CM

## 2024-05-02 DIAGNOSIS — M94.262 CHONDROMALACIA, LEFT KNEE: Primary | ICD-10-CM

## 2024-05-02 DIAGNOSIS — M94.261 CHONDROMALACIA OF RIGHT KNEE: ICD-10-CM

## 2024-05-02 DIAGNOSIS — M25.562 ACUTE PAIN OF BOTH KNEES: ICD-10-CM

## 2024-05-02 RX ORDER — DOCUSATE SODIUM 100 MG/1
100 CAPSULE, LIQUID FILLED ORAL 2 TIMES DAILY
COMMUNITY

## 2024-05-02 RX ADMIN — TRIAMCINOLONE ACETONIDE 80 MG: 40 INJECTION, SUSPENSION INTRA-ARTICULAR; INTRAMUSCULAR at 13:34

## 2024-05-02 RX ADMIN — LIDOCAINE HYDROCHLORIDE 4 ML: 10 INJECTION, SOLUTION EPIDURAL; INFILTRATION; INTRACAUDAL; PERINEURAL at 13:34

## 2024-05-02 NOTE — PROGRESS NOTES
Subjective:     Patient ID: Dank Cevallos is a 43 y.o. female.    Chief Complaint:  Bilateral knee pain, new patient to examiner  History of Present Illness  Dank Cevallos    The patient is a 43-year-old female who presents to clinic today for evaluation of bilateral knees.    The patient has a history of seeking medical attention at an external facility. She reports an exacerbation of pain during physical exertion, such as hiking and biking, which is exacerbated by the increased stress on her body, a role she engages in during work. The onset of her symptoms was approximately . The left knee, which she rates as 4 out of 10, is described as a stabbing sensation. She occasionally manages her symptoms with 800 mg of ibuprofen, which provides some relief. Additionally, she experiences discomfort even during rest and ambulation activities. She reports swelling and a numbing sensation radiating into her left foot, specifically the third and fourth digits. She denies any prior surgical intervention to either knee. She receives injections approximately every 6 months, which she finds beneficial. She also reports discomfort during deep squats and difficulty rising from a seated position. She denies any other concerns at present.    She does have a familial history of arthritis, but familial history of lupus, has not tested positive for any autoimmune conditions.   Social History     Occupational History    Not on file   Tobacco Use    Smoking status: Former     Current packs/day: 0.00     Average packs/day: 0.3 packs/day for 20.0 years (5.0 ttl pk-yrs)     Types: Cigarettes     Start date: 2002     Quit date: 2022     Years since quittin.6     Passive exposure: Past    Smokeless tobacco: Never   Vaping Use    Vaping status: Never Used   Substance and Sexual Activity    Alcohol use: Yes     Comment: Social occassion    Drug use: Never    Sexual activity: Yes     Partners: Male      Past Medical History:  "  Diagnosis Date    Anxiety     Bladder incontinence     Colon polyp     Depression     GERD (gastroesophageal reflux disease)     Headache     Heart murmur     Hyperlipidemia     Hypertension     Irritable bowel syndrome     Low back pain      Past Surgical History:   Procedure Laterality Date    COLONOSCOPY  2010    negative    COLONOSCOPY  2006    polyps    CYST REMOVAL  2000    On neck    ENDOMETRIAL ABLATION  04/2021    SUBTOTAL HYSTERECTOMY  06/2021    Partial       Family History   Problem Relation Age of Onset    Bipolar disorder Mother     Lupus Mother     No Known Problems Father     Irritable bowel syndrome Sister     Fibromyalgia Sister     No Known Problems Brother     Hypertension Daughter     Cancer Maternal Grandmother     Alcohol abuse Maternal Grandfather     Hypertension Maternal Grandfather     Liver cancer Maternal Grandfather     Diabetes Paternal Grandmother     Sleep apnea Paternal Grandmother     No Known Problems Paternal Grandfather                Objective:  Physical Exam    Vital signs reviewed.   General: No acute distress.  Eyes: conjunctiva clear; pupils equally round and reactive  ENT: external ears and nose atraumatic; oropharynx clear  CV: no peripheral edema  Resp: normal respiratory effort  Skin: no rashes or wounds; normal turgor  Psych: mood and affect appropriate; recent and remote memory intact    Vitals:    05/02/24 1305   BP: 126/78   Pulse: 80   Weight: 102 kg (224 lb)   Height: 175.3 cm (69\")         05/02/24  1305   Weight: 102 kg (224 lb)     Body mass index is 33.08 kg/m².      Right Knee Exam     Range of Motion   Extension:  0   Flexion:  130     Tests   Soniya:  Medial - negative Lateral - negative  Varus: negative Valgus: negative  Lachman:  Anterior - 1+      Drawer:  Anterior - negative    Posterior - negative  Patellar apprehension: positive    Other   Erythema: absent  Sensation: normal  Pulse: present  Swelling: mild      Left Knee Exam     Tenderness   The " patient is experiencing tenderness in the medial joint line and patella.    Range of Motion   Extension:  0   Flexion:  130     Tests   Soniya:  Medial - negative Lateral - negative  Varus: negative Valgus: negative  Lachman:  Anterior - 1+      Drawer:  Anterior - negative     Posterior - negative  Patellar apprehension: positive    Other   Erythema: absent  Sensation: normal  Pulse: present  Swelling: mild    Comments:  Positive tenderness along posterior joint line                 Imaging:  Independently reviewed three-view x-ray imaging including AP, lateral, tunnel view completed outside facility scanned into chart for review minimal medial compartment narrowing unsure of weightbearing status at time of imaging  Independently reviewed three-view x-ray images right knee 2022 unsure of weightbearing status imaging included AP, sunrise view, tunnel view minimal medial compartment narrowing    Bilateral Knee X-Ray  Indication: Pain    AP, Lateral, and Landis views    Findings:  No fracture  No bony lesion  Normal soft tissues  Mild medial compartment narrowing mild patellofemoral narrowing    prior studies were available for comparison.    Assessment:        1. Acute pain of both knees    2. Chondromalacia of right knee    3. Chondromalacia, left knee           Plan:    - Large Joint Arthrocentesis: bilateral knee on 5/2/2024 1:34 PM  Indications: pain  Details: 22 G needle, anterolateral approach  Medications (Right): 4 mL lidocaine PF 1% 1 %; 80 mg triamcinolone acetonide 40 MG/ML  Medications (Left): 80 mg triamcinolone acetonide 40 MG/ML; 4 mL lidocaine PF 1% 1 %  Outcome: tolerated well, no immediate complications  Procedure, treatment alternatives, risks and benefits explained, specific risks discussed. Consent was given by the patient. Immediately prior to procedure a time out was called to verify the correct patient, procedure, equipment, support staff and site/side marked as required. Patient was  prepped and draped in the usual sterile fashion.         Discussed plan of care with.     1. Bilateral knee pain.  The patient was engaged in a comprehensive discussion regarding the plan of care. The importance of incorporating quad, hamstring, and calf strengthening exercises was emphasized, and she was advised to commence these exercises. The possibility of corticosteroid injections to bilateral knees was also discussed, and she expressed her consent to proceed. The application of ice to the injection site was recommended this evening. It was discussed that if there is no symptom improvement with the ibuprofen, the possibility of diclofenac 75 mg twice daily could be considered. This can be taken once daily or only as needed. The patient was encouraged to reach out with any questions or concerns, and a follow-up appointment will be scheduled if her symptoms recur. All her queries were addressed.  Orders:  Orders Placed This Encounter   Procedures    - Large Joint Arthrocentesis: bilateral knee    XR Knee 3 View Bilateral     No orders of the defined types were placed in this encounter.          I ordered and reviewed the DEYSI today.       Dragon dictation utilized      Transcribed from ambient dictation for KEENA Parker by Raisa Rodriguez.  05/02/24   18:06 EDT    Patient or patient representative verbalized consent to the visit recording.  I have personally performed the services described in this document as transcribed by the above individual, and it is both accurate and complete.

## 2024-05-03 RX ORDER — TRIAMCINOLONE ACETONIDE 40 MG/ML
80 INJECTION, SUSPENSION INTRA-ARTICULAR; INTRAMUSCULAR
Status: COMPLETED | OUTPATIENT
Start: 2024-05-02 | End: 2024-05-02

## 2024-05-03 RX ORDER — LIDOCAINE HYDROCHLORIDE 10 MG/ML
4 INJECTION, SOLUTION EPIDURAL; INFILTRATION; INTRACAUDAL; PERINEURAL
Status: COMPLETED | OUTPATIENT
Start: 2024-05-02 | End: 2024-05-02

## 2024-05-09 ENCOUNTER — PATIENT ROUNDING (BHMG ONLY) (OUTPATIENT)
Dept: ORTHOPEDIC SURGERY | Facility: CLINIC | Age: 44
End: 2024-05-09
Payer: COMMERCIAL

## 2024-05-20 DIAGNOSIS — E78.2 MIXED HYPERLIPIDEMIA: ICD-10-CM

## 2024-05-20 DIAGNOSIS — K21.9 GASTROESOPHAGEAL REFLUX DISEASE, UNSPECIFIED WHETHER ESOPHAGITIS PRESENT: ICD-10-CM

## 2024-05-20 DIAGNOSIS — Z87.19 HISTORY OF HIATAL HERNIA: ICD-10-CM

## 2024-05-20 RX ORDER — ATORVASTATIN CALCIUM 10 MG/1
10 TABLET, FILM COATED ORAL DAILY
Qty: 90 TABLET | Refills: 3 | Status: SHIPPED | OUTPATIENT
Start: 2024-05-20

## 2024-05-20 RX ORDER — OMEPRAZOLE 40 MG/1
CAPSULE, DELAYED RELEASE ORAL
Qty: 90 CAPSULE | Refills: 0 | Status: SHIPPED | OUTPATIENT
Start: 2024-05-20

## 2024-05-21 ENCOUNTER — TELEMEDICINE (OUTPATIENT)
Dept: FAMILY MEDICINE CLINIC | Facility: TELEHEALTH | Age: 44
End: 2024-05-21
Payer: COMMERCIAL

## 2024-05-21 DIAGNOSIS — J01.40 ACUTE NON-RECURRENT PANSINUSITIS: Primary | ICD-10-CM

## 2024-05-21 DIAGNOSIS — F17.219 CIGARETTE NICOTINE DEPENDENCE WITH NICOTINE-INDUCED DISORDER: ICD-10-CM

## 2024-05-21 PROBLEM — F17.200 SMOKER: Status: ACTIVE | Noted: 2020-12-01

## 2024-05-21 PROBLEM — R12 HEARTBURN: Status: ACTIVE | Noted: 2020-12-01

## 2024-05-21 PROBLEM — F32.A DEPRESSION: Status: ACTIVE | Noted: 2024-05-21

## 2024-05-21 PROBLEM — Z80.41 FAMILY HISTORY OF OVARIAN CANCER: Status: ACTIVE | Noted: 2017-02-14

## 2024-05-21 PROBLEM — Z90.710 S/P LAPAROSCOPIC HYSTERECTOMY: Status: ACTIVE | Noted: 2021-07-12

## 2024-05-21 PROBLEM — F41.9 ANXIETY: Status: ACTIVE | Noted: 2024-05-21

## 2024-05-21 PROBLEM — I10 ESSENTIAL HYPERTENSION: Status: ACTIVE | Noted: 2019-02-14

## 2024-05-21 PROBLEM — G47.30 SLEEP APNEA: Status: ACTIVE | Noted: 2020-12-01

## 2024-05-21 PROBLEM — E78.00 ELEVATED CHOLESTEROL: Status: ACTIVE | Noted: 2019-02-14

## 2024-05-21 PROCEDURE — 99213 OFFICE O/P EST LOW 20 MIN: CPT | Performed by: NURSE PRACTITIONER

## 2024-05-21 RX ORDER — PREDNISONE 20 MG/1
20 TABLET ORAL 2 TIMES DAILY
Qty: 10 TABLET | Refills: 0 | Status: SHIPPED | OUTPATIENT
Start: 2024-05-21 | End: 2024-05-26

## 2024-05-21 RX ORDER — AMOXICILLIN AND CLAVULANATE POTASSIUM 875; 125 MG/1; MG/1
1 TABLET, FILM COATED ORAL 2 TIMES DAILY
Qty: 20 TABLET | Refills: 0 | Status: SHIPPED | OUTPATIENT
Start: 2024-05-21 | End: 2024-05-31

## 2024-05-21 NOTE — LETTER
May 21, 2024     Patient: Dank Cevallos   YOB: 1980   Date of Visit: 5/21/2024       To Whom It May Concern:    It is my medical opinion that Dank Cevallos be excused from work on 5-21-24 may return to work/school on 5-22-24 if fever free and symptoms improved            Sincerely,    Hetal Abel APRN     URGENT CARE VIDEO VISIT PROVIDER    CC: No Recipients

## 2024-05-21 NOTE — PROGRESS NOTES
You have chosen to receive care through a telehealth visit.  Do you consent to use a video/audio connection for your medical care today? Yes     CHIEF COMPLAINT  Chief Complaint   Patient presents with    Facial Pain         HPI  Dank Cevallos is a 43 y.o. female  presents with complaint of sinus pressure and pain, face pain, pain around eyes and having teeth pain. Reports symptoms started 2 weeks ago. Reports she is blowing bright green secretions from her nose. No fever or chills. No nausea or vomiting. No CP or SOA. Reports she has been taking zyrtec, Stahist and Sudafed for her symptoms. Reports she has history of TMJ and has made appt with her dentist     Review of Systems   Constitutional:  Negative for chills, fatigue and fever.   HENT:  Positive for congestion, sinus pressure and sinus pain. Negative for ear discharge, ear pain and sore throat.         Area below both eyes painful (maxillary sinuses)  Teeth are hurting    Respiratory:  Negative for cough, chest tightness, shortness of breath and wheezing.    Cardiovascular:  Negative for chest pain.   Gastrointestinal:  Negative for abdominal pain, diarrhea, nausea and vomiting.   Musculoskeletal:  Negative for back pain and myalgias.   Neurological:  Positive for headaches. Negative for dizziness.   Psychiatric/Behavioral: Negative.         Past Medical History:   Diagnosis Date    Anxiety     Bladder incontinence     Colon polyp     Depression     GERD (gastroesophageal reflux disease)     Headache     Heart murmur     Hyperlipidemia     Hypertension     Irritable bowel syndrome     Low back pain        Family History   Problem Relation Age of Onset    Bipolar disorder Mother     Lupus Mother     No Known Problems Father     Irritable bowel syndrome Sister     Fibromyalgia Sister     No Known Problems Brother     Hypertension Daughter     Cancer Maternal Grandmother     Alcohol abuse Maternal Grandfather     Hypertension Maternal Grandfather     Liver  cancer Maternal Grandfather     Diabetes Paternal Grandmother     Sleep apnea Paternal Grandmother     No Known Problems Paternal Grandfather        Social History     Socioeconomic History    Marital status:    Tobacco Use    Smoking status: Every Day     Current packs/day: 0.00     Average packs/day: 0.3 packs/day for 20.0 years (5.0 ttl pk-yrs)     Types: Cigarettes     Start date: 2002     Last attempt to quit: 2022     Years since quittin.7     Passive exposure: Past    Smokeless tobacco: Never   Vaping Use    Vaping status: Never Used   Substance and Sexual Activity    Alcohol use: Yes     Comment: Social occassion    Drug use: Never    Sexual activity: Yes     Partners: Male       Dank Cevallos  reports that she has been smoking cigarettes. She started smoking about 21 years ago. She has a 5 pack-year smoking history. She has been exposed to tobacco smoke. She has never used smokeless tobacco. I have educated her on the risk of diseases from using tobacco products such as cancer, COPD, and heart disease.     I advised her to quit and she is willing to quit. We have discussed the following method/s for tobacco cessation:  Counseling.  Patient reports she is using nicotine lozenges to help her stop smoking     I spent  1  minutes counseling the patient.              LMP  (LMP Unknown)   Breastfeeding No     PHYSICAL EXAM  Physical Exam   Constitutional: She is oriented to person, place, and time. She appears well-developed and well-nourished. No distress.   HENT:   Head: Normocephalic and atraumatic.   Right Ear: Hearing normal.   Left Ear: Hearing normal.   Nose: Congestion present. Right sinus exhibits maxillary sinus tenderness and frontal sinus tenderness. Left sinus exhibits maxillary sinus tenderness and frontal sinus tenderness.   Mouth/Throat: Mouth/Lips are normal.  Patient directed exam    Eyes: Conjunctivae and lids are normal.   Pulmonary/Chest: Effort normal.  No respiratory  distress.  Neurological: She is alert and oriented to person, place, and time.   Psychiatric: She has a normal mood and affect. Her speech is normal and behavior is normal.       Results for orders placed or performed in visit on 03/21/24   Hemoglobin A1c    Specimen: Blood   Result Value Ref Range    Hemoglobin A1C 5.20 4.80 - 5.60 %   Vitamin D 25 hydroxy    Specimen: Blood   Result Value Ref Range    25 Hydroxy, Vitamin D 59.6 30.0 - 100.0 ng/ml   Comprehensive metabolic panel    Specimen: Blood   Result Value Ref Range    Glucose 112 (H) 65 - 99 mg/dL    BUN 13 6 - 20 mg/dL    Creatinine 0.72 0.57 - 1.00 mg/dL    Sodium 138 136 - 145 mmol/L    Potassium 4.2 3.5 - 5.2 mmol/L    Chloride 103 98 - 107 mmol/L    CO2 25.0 22.0 - 29.0 mmol/L    Calcium 9.4 8.6 - 10.5 mg/dL    Total Protein 6.2 6.0 - 8.5 g/dL    Albumin 3.8 3.5 - 5.2 g/dL    ALT (SGPT) 21 1 - 33 U/L    AST (SGOT) 10 1 - 32 U/L    Alkaline Phosphatase 77 39 - 117 U/L    Total Bilirubin 0.4 0.0 - 1.2 mg/dL    Globulin 2.4 gm/dL    A/G Ratio 1.6 g/dL    BUN/Creatinine Ratio 18.1 7.0 - 25.0    Anion Gap 10.0 5.0 - 15.0 mmol/L    eGFR 107.9 >60.0 mL/min/1.73   Lipid panel    Specimen: Blood   Result Value Ref Range    Total Cholesterol 203 (H) 0 - 200 mg/dL    Triglycerides 132 0 - 150 mg/dL    HDL Cholesterol 39 (L) 40 - 60 mg/dL    LDL Cholesterol  140 (H) 0 - 100 mg/dL    VLDL Cholesterol 24 5 - 40 mg/dL    LDL/HDL Ratio 3.53    CBC Auto Differential    Specimen: Blood   Result Value Ref Range    WBC 7.26 3.40 - 10.80 10*3/mm3    RBC 4.90 3.77 - 5.28 10*6/mm3    Hemoglobin 15.6 12.0 - 15.9 g/dL    Hematocrit 45.5 34.0 - 46.6 %    MCV 92.9 79.0 - 97.0 fL    MCH 31.8 26.6 - 33.0 pg    MCHC 34.3 31.5 - 35.7 g/dL    RDW 12.5 12.3 - 15.4 %    RDW-SD 42.6 37.0 - 54.0 fl    MPV 11.2 6.0 - 12.0 fL    Platelets 229 140 - 450 10*3/mm3    Neutrophil % 59.2 42.7 - 76.0 %    Lymphocyte % 28.2 19.6 - 45.3 %    Monocyte % 9.6 5.0 - 12.0 %    Eosinophil % 1.4 0.3 - 6.2  %    Basophil % 0.6 0.0 - 1.5 %    Immature Grans % 1.0 (H) 0.0 - 0.5 %    Neutrophils, Absolute 4.30 1.70 - 7.00 10*3/mm3    Lymphocytes, Absolute 2.05 0.70 - 3.10 10*3/mm3    Monocytes, Absolute 0.70 0.10 - 0.90 10*3/mm3    Eosinophils, Absolute 0.10 0.00 - 0.40 10*3/mm3    Basophils, Absolute 0.04 0.00 - 0.20 10*3/mm3    Immature Grans, Absolute 0.07 (H) 0.00 - 0.05 10*3/mm3    nRBC 0.0 0.0 - 0.2 /100 WBC       Diagnoses and all orders for this visit:    1. Acute non-recurrent pansinusitis (Primary)    Other orders  -     amoxicillin-clavulanate (AUGMENTIN) 875-125 MG per tablet; Take 1 tablet by mouth 2 (Two) Times a Day for 10 days.  Dispense: 20 tablet; Refill: 0  -     predniSONE (DELTASONE) 20 MG tablet; Take 1 tablet by mouth 2 (Two) Times a Day for 5 days.  Dispense: 10 tablet; Refill: 0    Rest  Fluids  Follow up with dental as scheduled   PCP if symptoms persist   ER for any worsening symptoms such as high fever, chest pain or SOA       FOLLOW-UP  As discussed during visit with PCP/[facility] if no improvement or Urgent Care/Emergency Department if worsening of symptoms    Patient verbalizes understanding of medication dosage, comfort measures, instructions for treatment and follow-up.    Hetal Abel, KEENA  05/21/2024  05:41 EDT    The use of a video visit has been reviewed with the patient and verbal informed consent has been obtained. Myself and Dank Cevallos participated in this visit. The patient is located in [address] [place] [address] [place] [facility].    I am located in [place], [place]. [employer] and [employer] were utilized. I spent 5 minutes in the patient's chart for this visit.      Note Disclaimer: At [facility], we believe that sharing information builds trust and better   relationships. You are receiving this note because you recently visited [facility]. It is possible you   will see health information before a provider has talked with you about it. This kind of information can   be  easy to misunderstand. To help you fully understand what it means for your health, we urge you to   discuss this note with your provider.

## 2024-05-22 RX ORDER — POLYETHYLENE GLYCOL 3350 17 G
2 POWDER IN PACKET (EA) ORAL AS NEEDED
Qty: 288 LOZENGE | Refills: 1 | Status: SHIPPED | OUTPATIENT
Start: 2024-05-22

## 2024-06-16 ENCOUNTER — TELEMEDICINE (OUTPATIENT)
Dept: FAMILY MEDICINE CLINIC | Facility: TELEHEALTH | Age: 44
End: 2024-06-16
Payer: COMMERCIAL

## 2024-06-16 DIAGNOSIS — U07.1 COVID-19: Primary | ICD-10-CM

## 2024-06-16 NOTE — LETTER
June 16, 2024     Patient: Dank Cevallos   YOB: 1980   Date of Visit: 6/16/2024       To Whom it May Concern:    Dank Cevallos was seen in my clinic on 6/16/2024. She  may return to work on 6/20/2024 if symptoms allow.            Sincerely,          KEENA Bautista        CC: No Recipients

## 2024-06-16 NOTE — PROGRESS NOTES
You have chosen to receive care through a telehealth visit.  Do you consent to use a video/audio connection for your medical care today? Yes     HPI  Dank Cevallos is a 43 y.o. female  presents with complaint of headache , loss of taste and body aches. She has tested positive for covid 19 today. She reports no fever.     Review of Systems   Constitutional:  Positive for activity change, appetite change and fatigue. Negative for fever.   Respiratory: Negative.     Cardiovascular: Negative.    Gastrointestinal: Negative.    Musculoskeletal:  Positive for myalgias.   Neurological:  Positive for headaches.   Hematological: Negative.    Psychiatric/Behavioral: Negative.         Past Medical History:   Diagnosis Date    Anxiety     Bladder incontinence     Colon polyp     Depression     GERD (gastroesophageal reflux disease)     Headache     Heart murmur     Hyperlipidemia     Hypertension     Irritable bowel syndrome     Low back pain        Family History   Problem Relation Age of Onset    Bipolar disorder Mother     Lupus Mother     No Known Problems Father     Irritable bowel syndrome Sister     Fibromyalgia Sister     No Known Problems Brother     Hypertension Daughter     Cancer Maternal Grandmother     Alcohol abuse Maternal Grandfather     Hypertension Maternal Grandfather     Liver cancer Maternal Grandfather     Diabetes Paternal Grandmother     Sleep apnea Paternal Grandmother     No Known Problems Paternal Grandfather        Social History     Socioeconomic History    Marital status:    Tobacco Use    Smoking status: Every Day     Current packs/day: 0.00     Average packs/day: 0.3 packs/day for 20.0 years (5.0 ttl pk-yrs)     Types: Cigarettes     Start date: 2002     Last attempt to quit: 2022     Years since quittin.7     Passive exposure: Past    Smokeless tobacco: Never   Vaping Use    Vaping status: Never Used   Substance and Sexual Activity    Alcohol use: Yes     Comment: Social  occassion    Drug use: Never    Sexual activity: Yes     Partners: Male         LMP  (LMP Unknown)     PHYSICAL EXAM  Physical Exam   Constitutional: She is oriented to person, place, and time. She appears well-developed and well-nourished. She does not have a sickly appearance. She does not appear ill. No distress.   HENT:   Head: Normocephalic and atraumatic.   Nose: Nose normal.   Mouth/Throat: Mouth/Lips are normal.  Pulmonary/Chest: Effort normal.  No respiratory distress. She no audible wheeze...  Neurological: She is alert and oriented to person, place, and time.   Psychiatric: She has a normal mood and affect.   Vitals reviewed.      Diagnoses and all orders for this visit:    1. COVID-19 (Primary)  -     Nirmatrelvir & Ritonavir, 300mg/100mg, (PAXLOVID); Take 3 tablets by mouth 2 (Two) Times a Day. Indications: COVID-19 Confirmed Infection  Dispense: 30 tablet; Refill: 0      Quarantine as directed.   Increase fluids and rest  Vitamin C, D and zinc  Paxlovid fact sheet emailed to patient.   IBU and Tylenol as directed prn headache pain.     FOLLOW-UP  As discussed during visit with [facility], if symptoms worsen or fail to improve, follow-up with PCP/Urgent Care/Emergency Department.    Patient verbalizes understanding of medications, instructions for treatment and follow-up.    Precious Ceja, KEENA  06/16/2024  13:01 EDT    The use of a video visit has been reviewed with the patient and verbal informed consent has been obtained. Myself and Dank Cevallos participated in this visit. The patient is located in [place], and I am located in [place], [place]. [employer] and [employer]  were utilized.

## 2024-08-22 ENCOUNTER — PATIENT ROUNDING (BHMG ONLY) (OUTPATIENT)
Dept: FAMILY MEDICINE CLINIC | Facility: CLINIC | Age: 44
End: 2024-08-22
Payer: COMMERCIAL

## 2024-08-22 ENCOUNTER — OFFICE VISIT (OUTPATIENT)
Dept: FAMILY MEDICINE CLINIC | Facility: CLINIC | Age: 44
End: 2024-08-22
Payer: COMMERCIAL

## 2024-08-22 VITALS
DIASTOLIC BLOOD PRESSURE: 86 MMHG | HEIGHT: 69 IN | HEART RATE: 93 BPM | RESPIRATION RATE: 14 BRPM | SYSTOLIC BLOOD PRESSURE: 134 MMHG | WEIGHT: 218.6 LBS | OXYGEN SATURATION: 97 % | BODY MASS INDEX: 32.38 KG/M2

## 2024-08-22 DIAGNOSIS — B83.9 WORMS IN STOOL: ICD-10-CM

## 2024-08-22 DIAGNOSIS — M79.662 PAIN OF LEFT CALF: Primary | ICD-10-CM

## 2024-08-22 PROCEDURE — 99213 OFFICE O/P EST LOW 20 MIN: CPT

## 2024-08-22 NOTE — ASSESSMENT & PLAN NOTE
Sending for US DVT rule out. I believe patient is very low risk due to pain and swelling being related to an injury, location of injury, exam; however, discussed minimal risk of procedure/benefits with patient as well as patient is requesting to have this ruled out because of her personal concern. She does smoke and recent history of OCP use. She does have calf pain, swelling, bruising on exam. She will have this imaging and is already scheduled with orthopedic surgery for follow up in 8 days.

## 2024-08-22 NOTE — PROGRESS NOTES
Chief Complaint  Leg Swelling (Patient had a leg injury about 2 weeks ago and reported to the ER 5 days ago for redness, swelling, and warmth to the touch.  A venous doppler was not performed.)    Subjective    History of Present Illness {CC  Problem List  Visit  Diagnosis   Encounters  Notes  Medications  Labs  Result Review Imaging  Media :23}     Dank Cevallos presents to [facility] for Leg Swelling (Patient had a leg injury about 2 weeks ago and reported to the ER 5 days ago for redness, swelling, and warmth to the touch.  A venous doppler was not performed.).      History of Present Illness  43 YOF presents for follow up after emergency department visit with chief complaint of bruising to the left calf and left foot.     Patient was evaluated in the emergency department on 8/18/2024 (4 days ago) for this issue. XR was performed and was negative. She was told this was likely a healing hematoma and instructed to follow up with ortho as needed. We did obtain record of this visit and XR left tibia fibula on 8/18/2024 impression: no acute abnormality.     Patient says the initial injury occurred around 1.5 weeks ago while she was playing pickleball. Patient says she stepped forward and felt a pop in her left calf. She says she initially had pain which is much improved at this time. She has remaining bruising, swelling, and heat in the area. Patient is concerned for DVT as this wasn't ruled out in the ED. She does have calf pain and swelling in the area of the injury. She is able to ambulate on the left leg. No pain behind the knee or at the top of the calf. There is no chest pain or SOB. No history of DVT. Patient does express concern because she smokes and recent past use of OCP. She says she has a follow up appointment with ortho scheduled in 8 days.     Patient expresses concern for worms in stool. She is requesting enteric parasite testing because symptoms started after  "she recently treated her animals at home for round worms and tape worms. Patient says she has visualized what she thought could be worms and possibly eggs in her stool. She says this has been going on for several weeks. She does report having chronic issues with GI and stool due to IBS; however, she feels this is different from anything she has had with that in the past. No fever, chills, nausea, or vomiting.            Objective     Vital Signs:   /86 (BP Location: Left arm, Patient Position: Sitting, Cuff Size: Large Adult)   Pulse 93   Resp 14   Ht 175.3 cm (69\")   Wt 99.2 kg (218 lb 9.6 oz)   SpO2 97%   BMI 32.28 kg/m²   Current Outpatient Medications on File Prior to Visit   Medication Sig Dispense Refill    atorvastatin (LIPITOR) 10 MG tablet Take 1 tablet by mouth Daily. 90 tablet 3    buPROPion XL (FORFIVO XL) 450 MG 24 hr tablet Take 300 mg by mouth Daily.      Calcium Carb-Cholecalciferol 600-500 MG-UNIT capsule Take  by mouth Daily.      docusate sodium (COLACE) 100 MG capsule Take 1 capsule by mouth 2 (Two) Times a Day.      Gemtesa 75 MG tablet Take 1 tablet by mouth Daily.      LORazepam (ATIVAN) 0.5 MG tablet       multivitamin (THERAGRAN) tablet tablet Take  by mouth Daily.      nicotine polacrilex (COMMIT) 2 MG lozenge DISSOLVE 1 LOZENGE IN THE MOUTH AS NEEDED FOR SMOKING CESSATION. 288 lozenge 1    Nirmatrelvir & Ritonavir, 300mg/100mg, (PAXLOVID) Take 3 tablets by mouth 2 (Two) Times a Day. Indications: COVID-19 Confirmed Infection 30 tablet 0    Omega-3 Fatty Acids (fish oil) 1000 MG capsule capsule Take  by mouth Daily With Breakfast.      omeprazole (priLOSEC) 40 MG capsule TAKE 1 CAPSULE BY MOUTH ONE TIME DAILY 90 capsule 0     No current facility-administered medications on file prior to visit.        Past Medical History:   Diagnosis Date    Anxiety     Bladder incontinence     Colon polyp     Depression     GERD (gastroesophageal reflux disease)     Headache     Heart murmur     " Hyperlipidemia     Hypertension     Irritable bowel syndrome     Low back pain       Past Surgical History:   Procedure Laterality Date    COLONOSCOPY  2010    negative    COLONOSCOPY  2006    polyps    CYST REMOVAL  2000    On neck    ENDOMETRIAL ABLATION  2021    SUBTOTAL HYSTERECTOMY  2021    Partial      Family History   Problem Relation Age of Onset    Bipolar disorder Mother     Lupus Mother     No Known Problems Father     Irritable bowel syndrome Sister     Fibromyalgia Sister     No Known Problems Brother     Hypertension Daughter     Cancer Maternal Grandmother     Alcohol abuse Maternal Grandfather     Hypertension Maternal Grandfather     Liver cancer Maternal Grandfather     Diabetes Paternal Grandmother     Sleep apnea Paternal Grandmother     No Known Problems Paternal Grandfather       Social History     Socioeconomic History    Marital status:    Tobacco Use    Smoking status: Every Day     Current packs/day: 0.00     Average packs/day: 0.3 packs/day for 20.0 years (5.0 ttl pk-yrs)     Types: Cigarettes     Start date: 2002     Last attempt to quit: 2022     Years since quittin.9     Passive exposure: Past    Smokeless tobacco: Never   Vaping Use    Vaping status: Never Used   Substance and Sexual Activity    Alcohol use: Yes     Comment: Social occassion    Drug use: Never    Sexual activity: Yes     Partners: Male         No visits with results within 3 Month(s) from this visit.   Latest known visit with results is:   Lab on 2024   Component Date Value Ref Range Status    Hemoglobin A1C 2024 5.20  4.80 - 5.60 % Final    25 Hydroxy, Vitamin D 2024 59.6  30.0 - 100.0 ng/ml Final    Glucose 2024 112 (H)  65 - 99 mg/dL Final    BUN 2024 13  6 - 20 mg/dL Final    Creatinine 2024 0.72  0.57 - 1.00 mg/dL Final    Sodium 2024 138  136 - 145 mmol/L Final    Potassium 2024 4.2  3.5 - 5.2 mmol/L Final    Chloride 2024 103  98 -  107 mmol/L Final    CO2 03/21/2024 25.0  22.0 - 29.0 mmol/L Final    Calcium 03/21/2024 9.4  8.6 - 10.5 mg/dL Final    Total Protein 03/21/2024 6.2  6.0 - 8.5 g/dL Final    Albumin 03/21/2024 3.8  3.5 - 5.2 g/dL Final    ALT (SGPT) 03/21/2024 21  1 - 33 U/L Final    AST (SGOT) 03/21/2024 10  1 - 32 U/L Final    Alkaline Phosphatase 03/21/2024 77  39 - 117 U/L Final    Total Bilirubin 03/21/2024 0.4  0.0 - 1.2 mg/dL Final    Globulin 03/21/2024 2.4  gm/dL Final    A/G Ratio 03/21/2024 1.6  g/dL Final    BUN/Creatinine Ratio 03/21/2024 18.1  7.0 - 25.0 Final    Anion Gap 03/21/2024 10.0  5.0 - 15.0 mmol/L Final    eGFR 03/21/2024 107.9  >60.0 mL/min/1.73 Final    Total Cholesterol 03/21/2024 203 (H)  0 - 200 mg/dL Final    Triglycerides 03/21/2024 132  0 - 150 mg/dL Final    HDL Cholesterol 03/21/2024 39 (L)  40 - 60 mg/dL Final    LDL Cholesterol  03/21/2024 140 (H)  0 - 100 mg/dL Final    VLDL Cholesterol 03/21/2024 24  5 - 40 mg/dL Final    LDL/HDL Ratio 03/21/2024 3.53   Final    WBC 03/21/2024 7.26  3.40 - 10.80 10*3/mm3 Final    RBC 03/21/2024 4.90  3.77 - 5.28 10*6/mm3 Final    Hemoglobin 03/21/2024 15.6  12.0 - 15.9 g/dL Final    Hematocrit 03/21/2024 45.5  34.0 - 46.6 % Final    MCV 03/21/2024 92.9  79.0 - 97.0 fL Final    MCH 03/21/2024 31.8  26.6 - 33.0 pg Final    MCHC 03/21/2024 34.3  31.5 - 35.7 g/dL Final    RDW 03/21/2024 12.5  12.3 - 15.4 % Final    RDW-SD 03/21/2024 42.6  37.0 - 54.0 fl Final    MPV 03/21/2024 11.2  6.0 - 12.0 fL Final    Platelets 03/21/2024 229  140 - 450 10*3/mm3 Final    Neutrophil % 03/21/2024 59.2  42.7 - 76.0 % Final    Lymphocyte % 03/21/2024 28.2  19.6 - 45.3 % Final    Monocyte % 03/21/2024 9.6  5.0 - 12.0 % Final    Eosinophil % 03/21/2024 1.4  0.3 - 6.2 % Final    Basophil % 03/21/2024 0.6  0.0 - 1.5 % Final    Immature Grans % 03/21/2024 1.0 (H)  0.0 - 0.5 % Final    Neutrophils, Absolute 03/21/2024 4.30  1.70 - 7.00 10*3/mm3 Final    Lymphocytes, Absolute 03/21/2024  2.05  0.70 - 3.10 10*3/mm3 Final    Monocytes, Absolute 03/21/2024 0.70  0.10 - 0.90 10*3/mm3 Final    Eosinophils, Absolute 03/21/2024 0.10  0.00 - 0.40 10*3/mm3 Final    Basophils, Absolute 03/21/2024 0.04  0.00 - 0.20 10*3/mm3 Final    Immature Grans, Absolute 03/21/2024 0.07 (H)  0.00 - 0.05 10*3/mm3 Final    nRBC 03/21/2024 0.0  0.0 - 0.2 /100 WBC Final         Physical Exam  Vitals and nursing note reviewed.   Constitutional:       Appearance: Normal appearance. She is normal weight.   HENT:      Head: Normocephalic and atraumatic.   Cardiovascular:      Rate and Rhythm: Normal rate and regular rhythm.      Pulses: Normal pulses.      Heart sounds: Normal heart sounds. No murmur heard.     No friction rub. No gallop.   Pulmonary:      Effort: Pulmonary effort is normal. No respiratory distress.      Breath sounds: Normal breath sounds. No stridor. No wheezing, rhonchi or rales.   Chest:      Chest wall: No tenderness.   Abdominal:      General: Abdomen is flat. Bowel sounds are normal. There is no distension.      Palpations: Abdomen is soft. There is no mass.      Tenderness: There is no abdominal tenderness. There is no right CVA tenderness, left CVA tenderness, guarding or rebound.      Hernia: No hernia is present.   Musculoskeletal:      Comments: See media for image. Warmth, swelling, and significant bruising to the left calf. FROM of the left knee and left ankle. Distal pulses intact.    Skin:     General: Skin is warm and dry.      Capillary Refill: Capillary refill takes less than 2 seconds.      Coloration: Skin is not jaundiced or pale.   Neurological:      General: No focal deficit present.      Mental Status: She is alert and oriented to person, place, and time. Mental status is at baseline.      Motor: No weakness.      Coordination: Coordination normal.      Gait: Gait normal.   Psychiatric:         Mood and Affect: Mood normal.         Behavior: Behavior normal.         Thought Content: Thought  content normal.         Judgment: Judgment normal.          Result Review  Data Reviewed:{ Labs  Result Review  Imaging  Med Tab  Media :23}   I have reviewed this patient's chart.  I have reviewed previous labs, previous imaging, previous medications, and previous encounters with notes that were available in this patient's chart.               Assessment and Plan {CC Problem List  Visit Diagnosis  ROS  Review (Popup)  [facility]  Quality  BestPractice  Medications  SmartSets  SnapShot Encounters  Media :23}   Diagnoses and all orders for this visit:    1. Pain of left calf (Primary)  Assessment & Plan:  Sending for US DVT rule out. I believe patient is very low risk due to pain and swelling being related to an injury, location of injury, exam; however, discussed minimal risk of procedure/benefits with patient as well as patient is requesting to have this ruled out because of her personal concern. She does smoke and recent history of OCP use. She does have calf pain, swelling, bruising on exam. She will have this imaging and is already scheduled with orthopedic surgery for follow up in 8 days.     Orders:  -     Duplex Venous Lower Extremity - Left CAR; Future    2. Worms in stool  Comments:  Requested by patient after she noticed what she thought were worms and eggs in her stool - recent hx of treating her animals for round worms and tape worms  Orders:  -     Enteric Parasite Panel - Stool, Per Rectum; Future        -  -ER red flags discussed with patient including risk versus benefit and education provided.  -Follow-up with me      Follow Up {Instructions Charge Capture  Follow-up Communications :23}     Patient was given instructions and counseling regarding her condition or for health maintenance advice. Please see specific information pulled into the AVS (placed there by myself) if appropriate.    Return in about 1 month (around 9/22/2024) for ** When next scheduled for routine  follow up, patient says she is scheduled for her annual exam .      Clover Mcguire PA-C

## 2024-08-22 NOTE — PROGRESS NOTES
August 22, 2024    Hello, may I speak with Dank Cevallos?    My name is NIMISHA      I am  with [facility]  [address] [place] [address].    Before we get started may I verify your date of birth? 1980    I am calling to officially welcome you to our practice and ask about your recent visit. Is this a good time to talk? yes    Tell me about your visit with us. What things went well?  PATIENT STATES VISIT WAS WONDERFUL  REALLY LOVE HER        We're always looking for ways to make our patients' experiences even better. Do you have recommendations on ways we may improve?  no    Overall were you satisfied with your first visit to our practice? yes       I appreciate you taking the time to speak with me today. Is there anything else I can do for you? no      Thank you, and have a great day.

## 2024-08-26 DIAGNOSIS — B83.9 WORMS IN STOOL: ICD-10-CM

## 2024-08-30 ENCOUNTER — OFFICE VISIT (OUTPATIENT)
Dept: ORTHOPEDIC SURGERY | Facility: CLINIC | Age: 44
End: 2024-08-30
Payer: COMMERCIAL

## 2024-08-30 VITALS — HEIGHT: 69 IN | WEIGHT: 220 LBS | BODY MASS INDEX: 32.58 KG/M2

## 2024-08-30 DIAGNOSIS — M25.562 ACUTE PAIN OF BOTH KNEES: Primary | ICD-10-CM

## 2024-08-30 DIAGNOSIS — M25.561 ACUTE PAIN OF BOTH KNEES: Primary | ICD-10-CM

## 2024-08-30 DIAGNOSIS — S86.812A STRAIN OF CALF MUSCLE, LEFT, INITIAL ENCOUNTER: ICD-10-CM

## 2024-08-30 PROBLEM — S86.819A STRAIN OF CALF MUSCLE: Status: ACTIVE | Noted: 2024-08-30

## 2024-08-30 RX ORDER — PREDNISONE 10 MG/1
TABLET ORAL
Qty: 21 TABLET | Refills: 0 | Status: SHIPPED | OUTPATIENT
Start: 2024-08-30

## 2024-08-30 NOTE — PROGRESS NOTES
Subjective:     Patient ID: Dank Cevallos is a 43 y.o. female.    Chief Complaint:  Follow-up chondromalacia bilateral knees  Acute injury left knee  History of Present Illness  History of Present Illness  The patient is a 43-year-old female who presents to the clinic today for evaluation of her left lower extremity.    She was engaged in a game of pickleball on 2024 when she started to feel pain across the back of her calf muscle. She sought medical attention at an outside facility where x-ray images were taken.    The initial days post-injury were marked by severe bruising and swelling. Although the bruising has lessened, she continues to experience swelling but can still walk. She describes her current discomfort as zero out of 10, with symptoms including redness, swelling, bruising, and stiffness. The stiffness persists, and the swelling has extended down into her ankle.    Activities such as running, descending stairs, and working exacerbate the pain. She has attempted to alleviate the symptoms with rest, heat, ice, and anti-inflammatory medications, which have provided some relief. She reports no significant tenderness in the calf, apart from a strained feeling. She has no other concerns at present.    FAMILY HISTORY  She does not have a familial history of DVT.       Social History     Occupational History    Not on file   Tobacco Use    Smoking status: Some Days     Current packs/day: 0.00     Average packs/day: 0.3 packs/day for 20.0 years (5.0 ttl pk-yrs)     Types: Cigarettes     Start date: 2002     Last attempt to quit: 2022     Years since quittin.0     Passive exposure: Past    Smokeless tobacco: Never   Vaping Use    Vaping status: Never Used   Substance and Sexual Activity    Alcohol use: Yes     Comment: Social occassion    Drug use: Never    Sexual activity: Yes     Partners: Male     Birth control/protection: Hysterectomy      Past Medical History:   Diagnosis Date     "Anxiety     Bladder incontinence     Colon polyp     Depression     GERD (gastroesophageal reflux disease)     Headache     Heart murmur     Hyperlipidemia     Hypertension     Irritable bowel syndrome     Low back pain      Past Surgical History:   Procedure Laterality Date    COLONOSCOPY  2010    negative    COLONOSCOPY  2006    polyps    CYST REMOVAL  2000    On neck    ENDOMETRIAL ABLATION  04/2021    SUBTOTAL HYSTERECTOMY  06/2021    Partial       Family History   Problem Relation Age of Onset    Bipolar disorder Mother     Lupus Mother     No Known Problems Father     Irritable bowel syndrome Sister     Fibromyalgia Sister     No Known Problems Brother     Hypertension Daughter     Cancer Maternal Grandmother     Alcohol abuse Maternal Grandfather     Hypertension Maternal Grandfather     Liver cancer Maternal Grandfather     Diabetes Paternal Grandmother     Sleep apnea Paternal Grandmother     No Known Problems Paternal Grandfather                Objective:  Physical Exam    Vital signs reviewed.   General: No acute distress.  Eyes: conjunctiva clear; pupils equally round and reactive  ENT: external ears and nose atraumatic; oropharynx clear  CV: no peripheral edema  Resp: normal respiratory effort  Skin: no rashes or wounds; normal turgor  Psych: mood and affect appropriate; recent and remote memory intact    Vitals:    08/30/24 1446   Weight: 99.8 kg (220 lb)   Height: 175.3 cm (69\")         08/30/24  1446   Weight: 99.8 kg (220 lb)     Body mass index is 32.49 kg/m².      Ortho Exam     Physical Exam  Left lower extremity examined  Positive swelling along the calf  Knee motion 0 to 125 degrees  Negative Homans' sign  Positive sensation light touch all distributions left lower extremity  No evidence of ecchymosis  Negative Valenzuela squeeze test, negative palpable defect along the Achilles tendon    Assessment:        1. Acute pain of both knees    2. Strain of calf muscle, left, initial encounter       "   Assessment & Plan  1. Left lower extremity pain.  She experienced pain in her left calf muscle after playing pickleball on 2024. Initial symptoms included bruising and swelling, which have since reduced, although she continues to experience swelling and stiffness, particularly with activities such as running and descending stairs. She has been managing her symptoms with rest, heat, ice, and anti-inflammatory medications, which have provided decent symptom improvement. There is no significant calf tenderness or other concerns. No familial or personal history of DVT and no evidence of DVT or negative Roland's sign were noted. An oral steroid taper will be initiated to further reduce the swelling in her calf. She can resume play when ready, with a recommendation to stretch prior to exercise to reduce the risk of calf tears. She should continue using ice, alternating with heat.      Orders:  No orders of the defined types were placed in this encounter.    New Medications Ordered This Visit   Medications    predniSONE (DELTASONE) 10 MG tablet     Si tabs day 1, 5 tabs day 2, 4 tabs day 3, 3 tabs day 4, 2 tabs day 5, 1 tab day 6     Dispense:  21 tablet     Refill:  0       Dragon dictation utilized          Patient or patient representative verbalized consent for the use of Ambient Listening during the visit with  KEENA Parker for chart documentation. 2024  15:36 EDT

## 2024-09-10 ENCOUNTER — OFFICE VISIT (OUTPATIENT)
Dept: FAMILY MEDICINE CLINIC | Facility: CLINIC | Age: 44
End: 2024-09-10

## 2024-09-10 VITALS
HEART RATE: 74 BPM | TEMPERATURE: 98 F | SYSTOLIC BLOOD PRESSURE: 120 MMHG | OXYGEN SATURATION: 97 % | HEIGHT: 69 IN | BODY MASS INDEX: 32.32 KG/M2 | RESPIRATION RATE: 18 BRPM | WEIGHT: 218.2 LBS | DIASTOLIC BLOOD PRESSURE: 80 MMHG

## 2024-09-10 DIAGNOSIS — Z13.1 SCREENING FOR DIABETES MELLITUS: ICD-10-CM

## 2024-09-10 DIAGNOSIS — E78.2 MIXED HYPERLIPIDEMIA: ICD-10-CM

## 2024-09-10 DIAGNOSIS — G47.33 OBSTRUCTIVE SLEEP APNEA SYNDROME: ICD-10-CM

## 2024-09-10 DIAGNOSIS — F17.219 CIGARETTE NICOTINE DEPENDENCE WITH NICOTINE-INDUCED DISORDER: ICD-10-CM

## 2024-09-10 DIAGNOSIS — I10 ESSENTIAL HYPERTENSION: Primary | ICD-10-CM

## 2024-09-10 DIAGNOSIS — Z13.29 SCREENING FOR THYROID DISORDER: ICD-10-CM

## 2024-09-10 LAB
BASOPHILS # BLD AUTO: 0.05 10*3/MM3 (ref 0–0.2)
BASOPHILS NFR BLD AUTO: 0.5 % (ref 0–1.5)
DEPRECATED RDW RBC AUTO: 42.9 FL (ref 37–54)
EOSINOPHIL # BLD AUTO: 0.15 10*3/MM3 (ref 0–0.4)
EOSINOPHIL NFR BLD AUTO: 1.5 % (ref 0.3–6.2)
ERYTHROCYTE [DISTWIDTH] IN BLOOD BY AUTOMATED COUNT: 12.6 % (ref 12.3–15.4)
HBA1C MFR BLD: 5.2 % (ref 4.8–5.6)
HCT VFR BLD AUTO: 45.9 % (ref 34–46.6)
HGB BLD-MCNC: 15.8 G/DL (ref 12–15.9)
IMM GRANULOCYTES # BLD AUTO: 0.09 10*3/MM3 (ref 0–0.05)
IMM GRANULOCYTES NFR BLD AUTO: 0.9 % (ref 0–0.5)
LYMPHOCYTES # BLD AUTO: 2.56 10*3/MM3 (ref 0.7–3.1)
LYMPHOCYTES NFR BLD AUTO: 25 % (ref 19.6–45.3)
MCH RBC QN AUTO: 32.4 PG (ref 26.6–33)
MCHC RBC AUTO-ENTMCNC: 34.4 G/DL (ref 31.5–35.7)
MCV RBC AUTO: 94.1 FL (ref 79–97)
MONOCYTES # BLD AUTO: 0.83 10*3/MM3 (ref 0.1–0.9)
MONOCYTES NFR BLD AUTO: 8.1 % (ref 5–12)
NEUTROPHILS NFR BLD AUTO: 6.56 10*3/MM3 (ref 1.7–7)
NEUTROPHILS NFR BLD AUTO: 64 % (ref 42.7–76)
NRBC BLD AUTO-RTO: 0 /100 WBC (ref 0–0.2)
PLATELET # BLD AUTO: 238 10*3/MM3 (ref 140–450)
PMV BLD AUTO: 11.4 FL (ref 6–12)
RBC # BLD AUTO: 4.88 10*6/MM3 (ref 3.77–5.28)
WBC NRBC COR # BLD AUTO: 10.24 10*3/MM3 (ref 3.4–10.8)

## 2024-09-10 PROCEDURE — 80061 LIPID PANEL: CPT | Performed by: REGISTERED NURSE

## 2024-09-10 PROCEDURE — 83036 HEMOGLOBIN GLYCOSYLATED A1C: CPT | Performed by: REGISTERED NURSE

## 2024-09-10 PROCEDURE — 80050 GENERAL HEALTH PANEL: CPT | Performed by: REGISTERED NURSE

## 2024-09-10 PROCEDURE — 99215 OFFICE O/P EST HI 40 MIN: CPT | Performed by: REGISTERED NURSE

## 2024-09-10 RX ORDER — POLYETHYLENE GLYCOL 3350 17 G
2 POWDER IN PACKET (EA) ORAL AS NEEDED
Qty: 288 LOZENGE | Refills: 1 | Status: SHIPPED | OUTPATIENT
Start: 2024-09-10

## 2024-09-10 RX ORDER — MIRABEGRON 50 MG/1
50 TABLET, EXTENDED RELEASE ORAL DAILY
COMMUNITY
Start: 2024-09-05 | End: 2024-12-04

## 2024-09-11 LAB
ALBUMIN SERPL-MCNC: 3.8 G/DL (ref 3.5–5.2)
ALBUMIN/GLOB SERPL: 1.8 G/DL
ALP SERPL-CCNC: 66 U/L (ref 39–117)
ALT SERPL W P-5'-P-CCNC: 22 U/L (ref 1–33)
ANION GAP SERPL CALCULATED.3IONS-SCNC: 9.7 MMOL/L (ref 5–15)
AST SERPL-CCNC: 14 U/L (ref 1–32)
BILIRUB SERPL-MCNC: 0.6 MG/DL (ref 0–1.2)
BUN SERPL-MCNC: 12 MG/DL (ref 6–20)
BUN/CREAT SERPL: 16.2 (ref 7–25)
CALCIUM SPEC-SCNC: 8.9 MG/DL (ref 8.6–10.5)
CHLORIDE SERPL-SCNC: 101 MMOL/L (ref 98–107)
CHOLEST SERPL-MCNC: 203 MG/DL (ref 0–200)
CO2 SERPL-SCNC: 26.3 MMOL/L (ref 22–29)
CREAT SERPL-MCNC: 0.74 MG/DL (ref 0.57–1)
EGFRCR SERPLBLD CKD-EPI 2021: 104.4 ML/MIN/1.73
GLOBULIN UR ELPH-MCNC: 2.1 GM/DL
GLUCOSE SERPL-MCNC: 88 MG/DL (ref 65–99)
HDLC SERPL-MCNC: 42 MG/DL (ref 40–60)
LDLC SERPL CALC-MCNC: 127 MG/DL (ref 0–100)
LDLC/HDLC SERPL: 2.93 {RATIO}
POTASSIUM SERPL-SCNC: 3.8 MMOL/L (ref 3.5–5.2)
PROT SERPL-MCNC: 5.9 G/DL (ref 6–8.5)
SODIUM SERPL-SCNC: 137 MMOL/L (ref 136–145)
TRIGL SERPL-MCNC: 190 MG/DL (ref 0–150)
TSH SERPL DL<=0.05 MIU/L-ACNC: 0.82 UIU/ML (ref 0.27–4.2)
VLDLC SERPL-MCNC: 34 MG/DL (ref 5–40)

## 2024-10-30 DIAGNOSIS — K21.9 GASTROESOPHAGEAL REFLUX DISEASE, UNSPECIFIED WHETHER ESOPHAGITIS PRESENT: ICD-10-CM

## 2024-10-30 DIAGNOSIS — E78.2 MIXED HYPERLIPIDEMIA: ICD-10-CM

## 2024-10-30 DIAGNOSIS — Z87.19 HISTORY OF HIATAL HERNIA: ICD-10-CM

## 2024-10-30 RX ORDER — OMEPRAZOLE 40 MG/1
CAPSULE, DELAYED RELEASE ORAL
Qty: 90 CAPSULE | Refills: 0 | Status: SHIPPED | OUTPATIENT
Start: 2024-10-30

## 2024-11-12 ENCOUNTER — PATIENT MESSAGE (OUTPATIENT)
Dept: FAMILY MEDICINE CLINIC | Facility: CLINIC | Age: 44
End: 2024-11-12
Payer: COMMERCIAL

## 2025-02-16 DIAGNOSIS — K21.9 GASTROESOPHAGEAL REFLUX DISEASE, UNSPECIFIED WHETHER ESOPHAGITIS PRESENT: ICD-10-CM

## 2025-02-16 DIAGNOSIS — Z87.19 HISTORY OF HIATAL HERNIA: ICD-10-CM

## 2025-02-16 DIAGNOSIS — E78.2 MIXED HYPERLIPIDEMIA: ICD-10-CM

## 2025-02-17 RX ORDER — OMEPRAZOLE 40 MG/1
CAPSULE, DELAYED RELEASE ORAL
Qty: 90 CAPSULE | Refills: 0 | Status: SHIPPED | OUTPATIENT
Start: 2025-02-17

## 2025-04-02 ENCOUNTER — TELEPHONE (OUTPATIENT)
Dept: FAMILY MEDICINE CLINIC | Facility: CLINIC | Age: 45
End: 2025-04-02
Payer: COMMERCIAL

## 2025-04-02 NOTE — TELEPHONE ENCOUNTER
CALLED AND SPOKE WITH PT LETTING HER KNOW THAT SHE WILL NEED TO SEE [facility] FOR MED FOR HER MIGRAINES SHE STATES HAS KENNY WITH NEURO IN MAY SHE WILL WAIT TO SEE THEM. I ADVISE HER IF SHE NEEDS ANYTHING GIVE US A CALL T VOICE UNDERSTOOD

## 2025-04-02 NOTE — TELEPHONE ENCOUNTER
Hub staff attempted to follow warm transfer process and was unsuccessful     Caller: Dank Cevallos    Relationship to patient: Self    Best call back number: [phone]     Patient is needing: PATIENT IS RETURNING MISSED CALL FROM [place].

## 2025-04-11 ENCOUNTER — OFFICE VISIT (OUTPATIENT)
Dept: NEUROLOGY | Facility: CLINIC | Age: 45
End: 2025-04-11
Payer: COMMERCIAL

## 2025-04-11 ENCOUNTER — PATIENT ROUNDING (BHMG ONLY) (OUTPATIENT)
Dept: NEUROLOGY | Facility: CLINIC | Age: 45
End: 2025-04-11
Payer: COMMERCIAL

## 2025-04-11 VITALS
OXYGEN SATURATION: 96 % | DIASTOLIC BLOOD PRESSURE: 80 MMHG | BODY MASS INDEX: 33.82 KG/M2 | SYSTOLIC BLOOD PRESSURE: 120 MMHG | HEART RATE: 74 BPM | WEIGHT: 229 LBS

## 2025-04-11 DIAGNOSIS — G43.101 MIGRAINE WITH AURA AND WITH STATUS MIGRAINOSUS, NOT INTRACTABLE: Primary | ICD-10-CM

## 2025-04-11 PROCEDURE — 99204 OFFICE O/P NEW MOD 45 MIN: CPT | Performed by: PSYCHIATRY & NEUROLOGY

## 2025-04-11 RX ORDER — TOPIRAMATE 25 MG/1
25 TABLET, FILM COATED ORAL 2 TIMES DAILY
Qty: 60 TABLET | Refills: 3 | Status: SHIPPED | OUTPATIENT
Start: 2025-04-11 | End: 2026-04-11

## 2025-04-11 RX ORDER — MULTIVIT-MIN/IRON/FOLIC ACID/K 18-600-40
CAPSULE ORAL
COMMUNITY
Start: 2024-04-11

## 2025-04-11 RX ORDER — RIZATRIPTAN BENZOATE 10 MG/1
10 TABLET ORAL ONCE AS NEEDED
Qty: 9 TABLET | Refills: 3 | Status: SHIPPED | OUTPATIENT
Start: 2025-04-11 | End: 2025-05-11

## 2025-04-11 RX ORDER — VITAMIN B COMPLEX
TABLET ORAL
COMMUNITY
Start: 2025-01-01

## 2025-04-11 RX ORDER — POLYETHYLENE GLYCOL 3350 17 G/17G
POWDER, FOR SOLUTION ORAL
COMMUNITY
Start: 2025-04-08

## 2025-04-11 NOTE — PROGRESS NOTES
Notes by CMA:  Ms Cevallos is here today as a referral from Diana Basilio OD regarding an issue with headaches. She experiences 3-4 episodes per week.      Subjective:     Dear Dr. Basilio, thank you for referring Mrs. Cevallos for neurological consultation.  As you know, she is a 44-year-old right-handed woman sent for evaluation of headaches.  She has had headache since about 21 years of age.  They are typically frontal or occipital with a sense of pressure.  There is associated nausea, photophobia, sonophobia, movement sensitivity and visual changes.  When they occur during wakefulness she has visual aura prior.  More recently she has been awakening with headache.  However, her obstructive sleep apnea appears to be under reasonably good control.  She had a nice response to Botox injections presumably for her grinding.  They last from several hours and occasionally out to several days.  She is having 12-16 headache days per month on average currently.  She usually takes an over-the-counter concoction of medications and goes and lays down and tries to sleep them off.  Her sister and daughter have migrainous headaches.  Patient ID: Dank Cevallos is a 44 y.o. female.    Headache    The following portions of the patient's history were reviewed and updated as appropriate: allergies, current medications, past family history, past medical history, past social history, past surgical history, and problem list.    Review of Systems   Constitutional:  Negative for activity change, appetite change and fatigue.   HENT:  Negative for facial swelling, trouble swallowing and voice change.    Eyes:  Positive for photophobia, pain and visual disturbance.   Respiratory:  Negative for chest tightness, shortness of breath and wheezing.    Cardiovascular:  Negative for chest pain, palpitations and leg swelling.   Gastrointestinal:  Positive for nausea and vomiting. Negative for abdominal pain.   Endocrine: Negative for cold  intolerance and heat intolerance.   Musculoskeletal:  Positive for arthralgias, back pain, myalgias and neck pain. Negative for gait problem, joint swelling and neck stiffness.   Neurological:  Positive for headaches. Negative for dizziness, tremors, seizures, syncope, facial asymmetry, speech difficulty, weakness, light-headedness and numbness.   Hematological:  Does not bruise/bleed easily.   Psychiatric/Behavioral:  Negative for agitation, behavioral problems, confusion, decreased concentration, dysphoric mood, hallucinations, self-injury, sleep disturbance and suicidal ideas. The patient is nervous/anxious. The patient is not hyperactive.         Objective:    Neurological Exam   Awake alert pleasant cooperative oriented in all spheres with fluent speech and normal speech comprehension.    Cranial nerves II through XII normal and symmetric.    Motor exam reveals normal symmetric tone bulk and power throughout without drift or spasticity.  No adventitious movements.    The sensory exam reveals normal and symmetric sensation to light touch, temperature, vibration throughout.    Coordination testing reveals smooth and accurate finger-nose-finger bilaterally, normal heel-to-shin bilaterally and normal rhythmic rapid alternating movements.  Romberg testing is negative.    Tendon reflexes are 2+ and symmetric throughout including preserved ankle jerks bilaterally.  No ankle clonus.    Physical Exam    Assessment/Plan:     Diagnoses and all orders for this visit:    1. Migraine with aura and with status migrainosus, not intractable (Primary)    Other orders  -     topiramate (Topamax) 25 MG tablet; Take 1 tablet by mouth 2 (Two) Times a Day.  Dispense: 60 tablet; Refill: 3  -     rizatriptan (Maxalt) 10 MG tablet; Take 1 tablet by mouth 1 (One) Time As Needed for Migraine for up to 30 days. May repeat in 2 hours if needed  Dispense: 9 tablet; Refill: 3     44-year-old woman with a long history of headaches satisfying  diagnostic criteria for migraine.  We had a discussion about the underlying diagnosis and pathophysiology as well as lifestyle factors that can impact headache frequency and severity.  Her headache frequency certainly warrants prophylaxis and I am starting her on topiramate with a titration to 25 mg twice daily.  For acute therapy I am adding Maxalt 10 mg as needed with specific instructions on its appropriate use and potential side effects.  Good hydration was recommended and a regular exercise program.  We talked about trigger avoidance.  Will bring her back to clinic in 2 to 3 months to see how she is doing.  Thank you very much for the opportunity to participate in her care.

## 2025-05-14 DIAGNOSIS — Z87.19 HISTORY OF HIATAL HERNIA: ICD-10-CM

## 2025-05-14 DIAGNOSIS — K21.9 GASTROESOPHAGEAL REFLUX DISEASE, UNSPECIFIED WHETHER ESOPHAGITIS PRESENT: ICD-10-CM

## 2025-05-14 DIAGNOSIS — E78.2 MIXED HYPERLIPIDEMIA: ICD-10-CM

## 2025-05-14 RX ORDER — OMEPRAZOLE 40 MG/1
40 CAPSULE, DELAYED RELEASE ORAL DAILY
Qty: 30 CAPSULE | Refills: 0 | Status: SHIPPED | OUTPATIENT
Start: 2025-05-14

## 2025-05-20 ENCOUNTER — OFFICE VISIT (OUTPATIENT)
Dept: FAMILY MEDICINE CLINIC | Facility: CLINIC | Age: 45
End: 2025-05-20
Payer: COMMERCIAL

## 2025-05-20 VITALS
OXYGEN SATURATION: 97 % | HEIGHT: 69 IN | SYSTOLIC BLOOD PRESSURE: 139 MMHG | WEIGHT: 222.4 LBS | RESPIRATION RATE: 18 BRPM | TEMPERATURE: 98.3 F | DIASTOLIC BLOOD PRESSURE: 57 MMHG | BODY MASS INDEX: 32.94 KG/M2 | HEART RATE: 76 BPM

## 2025-05-20 DIAGNOSIS — Z13.29 SCREENING FOR THYROID DISORDER: ICD-10-CM

## 2025-05-20 DIAGNOSIS — Z13.1 SCREENING FOR DIABETES MELLITUS: ICD-10-CM

## 2025-05-20 DIAGNOSIS — I10 ESSENTIAL HYPERTENSION: ICD-10-CM

## 2025-05-20 DIAGNOSIS — Z01.818 PRE-OP EVALUATION: ICD-10-CM

## 2025-05-20 DIAGNOSIS — E78.2 MIXED HYPERLIPIDEMIA: Primary | ICD-10-CM

## 2025-05-20 RX ORDER — RIZATRIPTAN BENZOATE 10 MG/1
10 TABLET, ORALLY DISINTEGRATING ORAL ONCE AS NEEDED
COMMUNITY

## 2025-05-20 RX ORDER — SUMATRIPTAN SUCCINATE 25 MG/1
25 TABLET ORAL
COMMUNITY
End: 2025-06-16

## 2025-05-20 NOTE — PROGRESS NOTES
Chief Complaint  Procedure (Needs pre op stuff done for bladder surgery)    Subjective    History of Present Illness {CC  Problem List  Visit  Diagnosis   Encounters  Notes  Medications  Labs  Result Review Imaging  Media :23}     Dank Cevallos presents to [facility] for Procedure (Needs pre op stuff done for bladder surgery).      History of Present Illness  Patient is a 44 y.o. female who presents to the clinic today for preoperative clearance for surgery, 3-month follow-up for hypertension, and 3-month follow-up for hyperlipidemia.  Patient denies any chest pain, shortness of breath, or any fevers.  Patient denies any known exposure to COVID, flu, or any other contagious illnesses.       History of Present Illness  In regards to preoperative clearance, she is scheduled for bladder surgery on 06/18/2025 under the care of [facility]. The procedure was initially postponed due to a nasal surgery she underwent in 03/2025. She reports having had a nerve test a couple of months ago. She has not had any recent laboratory tests or EKGs. She reports no chest pain or shortness of breath. She has been advised to discontinue all vitamins two weeks prior to the surgery and NSAIDs seven days prior.  Patient denies any concerns or issues with her upcoming surgery at this time.  We discussed getting labs and an EKG to make sure all is well for surgery.    In regards to hypertension, patient's blood pressure today is 139/57.  She shares that it is typically around 130/60.  Patient does not currently take medication to manage this.  She manages through diet and lifestyle modifications.  She denies any concerns or issues with her hypertension at this time.    In regards to hyperlipidemia, patient is currently taking atorvastatin to manage her hyperlipidemia.  She has not been adhering to her prescribed cholesterol medication regimen faithfully she shares. She is not fasting today  but acknowledges the need to fast for her upcoming cholesterol test.  Patient denies any concerns with her hyperlipidemia or atorvastatin at this time.    In regards to migraines, she is currently on Topamax for migraine management, which has resulted in a decreased appetite and aversion to beer.  Patient recently established with neurologist Dr. Zelalem Larose.  She denies any concerns or issues or need from primary care standpoint in regards to her migraines at this time.    PAST SURGICAL HISTORY:  Nasal surgery in 03/2025       Review of Systems   Constitutional: Negative.  Negative for activity change, chills, fatigue and fever.   HENT: Negative.  Negative for congestion, dental problem, ear pain, hearing loss, rhinorrhea, sinus pain, sore throat, tinnitus and trouble swallowing.    Eyes: Negative.  Negative for pain and visual disturbance.   Respiratory: Negative.  Negative for cough, chest tightness, shortness of breath and wheezing.    Cardiovascular: Negative.  Negative for chest pain, palpitations and leg swelling.   Gastrointestinal: Negative.  Negative for abdominal pain, diarrhea, nausea and vomiting.   Endocrine: Negative.  Negative for polydipsia, polyphagia and polyuria.   Genitourinary: Negative.  Negative for difficulty urinating, dysuria, frequency and urgency.   Musculoskeletal: Negative.  Negative for arthralgias, back pain and myalgias.   Skin: Negative.  Negative for color change, pallor, rash and wound.   Allergic/Immunologic: Negative.  Negative for environmental allergies.   Neurological: Negative.  Negative for dizziness, speech difficulty, weakness, light-headedness, numbness and headaches.   Hematological: Negative.    Psychiatric/Behavioral: Negative.  Negative for confusion, decreased concentration, self-injury and suicidal ideas. The patient is not nervous/anxious.    All other systems reviewed and are negative.       Objective     Vital Signs:   /57 (BP Location: Right arm,  "Patient Position: Sitting, Cuff Size: Adult)   Pulse 76   Temp 98.3 °F (36.8 °C) (Temporal)   Resp 18   Ht 175.3 cm (69\")   Wt 101 kg (222 lb 6.4 oz)   SpO2 97%   BMI 32.84 kg/m²   Current Outpatient Medications on File Prior to Visit   Medication Sig Dispense Refill    atorvastatin (LIPITOR) 10 MG tablet Take 1 tablet by mouth Daily. 90 tablet 3    buPROPion XL (FORFIVO XL) 450 MG 24 hr tablet Take 300 mg by mouth Daily.      Calcium Carb-Cholecalciferol 600-500 MG-UNIT capsule Take  by mouth Daily.      Cholecalciferol (Vitamin D) 50 MCG (2000 UT) capsule       Coenzyme Q10 (CoQ10) 100 MG capsule       docusate sodium (COLACE) 100 MG capsule Take 1 capsule by mouth 2 (Two) Times a Day.      Fiber 500 MG capsule       LORazepam (ATIVAN) 0.5 MG tablet       multivitamin (THERAGRAN) tablet tablet Take  by mouth Daily.      nicotine polacrilex (COMMIT) 2 MG lozenge Dissolve 1 lozenge in the mouth As Needed for Smoking Cessation. 288 lozenge 1    omeprazole (priLOSEC) 40 MG capsule TAKE 1 CAPSULE BY MOUTH ONE TIME DAILY 30 capsule 0    polyethylene glycol (MiraLax) 17 GM/SCOOP powder       rizatriptan MLT (MAXALT-MLT) 10 MG disintegrating tablet Place 1 tablet on the tongue 1 (One) Time As Needed for Migraine. May repeat in 2 hours if needed      topiramate (Topamax) 25 MG tablet Take 1 tablet by mouth 2 (Two) Times a Day. 60 tablet 3    rizatriptan (Maxalt) 10 MG tablet Take 1 tablet by mouth 1 (One) Time As Needed for Migraine for up to 30 days. May repeat in 2 hours if needed 9 tablet 3    [DISCONTINUED] Gemtesa 75 MG tablet Take 1 tablet by mouth Daily. (Patient not taking: Reported on 9/10/2024)      [DISCONTINUED] Nirmatrelvir & Ritonavir, 300mg/100mg, (PAXLOVID) Take 3 tablets by mouth 2 (Two) Times a Day. Indications: COVID-19 Confirmed Infection 30 tablet 0    [DISCONTINUED] Omega-3 Fatty Acids (fish oil) 1000 MG capsule capsule Take  by mouth Daily With Breakfast.      [DISCONTINUED] predniSONE " (DELTASONE) 10 MG tablet 6 tabs day 1, 5 tabs day 2, 4 tabs day 3, 3 tabs day 4, 2 tabs day 5, 1 tab day 6 (Patient not taking: Reported on 9/10/2024) 21 tablet 0    [DISCONTINUED] SUMAtriptan (IMITREX) 25 MG tablet Take 1 tablet by mouth Every 2 (Two) Hours As Needed for Migraine. Take one tablet at onset of headache. May repeat dose one time in 2 hours if headache not relieved. (Patient not taking: Reported on 5/20/2025)       No current facility-administered medications on file prior to visit.        Past Medical History:   Diagnosis Date    Anxiety     Bladder incontinence     Colon polyp 2022    Depression 2002    Endometriosis 2021    Fibroid 2021    GERD (gastroesophageal reflux disease)     Headache 2002    Headache, tension-type     Heart murmur     Hyperlipidemia 2020    Hypertension 2019    Irritable bowel syndrome     Low back pain     Migraine 2002    Obesity     Ovarian cyst 2009    Sleep apnea     Varicella 1986      Past Surgical History:   Procedure Laterality Date    COLONOSCOPY  2010    negative    COLONOSCOPY  2022    polyps    CYST REMOVAL  2000    On neck    ENDOMETRIAL ABLATION  04/2021    OOPHORECTOMY      SUBTOTAL HYSTERECTOMY  06/2021    Partial    TOTAL ABDOMINAL HYSTERECTOMY      WISDOM TOOTH EXTRACTION  2001      Family History   Problem Relation Age of Onset    Bipolar disorder Mother     Lupus Mother     No Known Problems Father     Irritable bowel syndrome Sister     Fibromyalgia Sister     Migraines Sister     No Known Problems Brother     Hypertension Daughter     Cancer Maternal Grandmother     Ovarian cancer Maternal Grandmother     Alcohol abuse Maternal Grandfather     Hypertension Maternal Grandfather     Liver cancer Maternal Grandfather     Diabetes Paternal Grandmother     Sleep apnea Paternal Grandmother     No Known Problems Paternal Grandfather     Uterine cancer Maternal Aunt     Ataxia Paternal Uncle       Social History     Socioeconomic History    Marital status:     Tobacco Use    Smoking status: Former     Current packs/day: 0.00     Average packs/day: 0.3 packs/day for 20.0 years (5.0 ttl pk-yrs)     Types: Cigarettes     Start date: 2002     Quit date: 2022     Years since quittin.7     Passive exposure: Past    Smokeless tobacco: Never   Vaping Use    Vaping status: Never Used   Substance and Sexual Activity    Alcohol use: Yes     Comment: Social occassion    Drug use: Never    Sexual activity: Yes     Partners: Male     Birth control/protection: Hysterectomy         No visits with results within 3 Month(s) from this visit.   Latest known visit with results is:   Office Visit on 09/10/2024   Component Date Value Ref Range Status    Glucose 09/10/2024 88  65 - 99 mg/dL Final    BUN 09/10/2024 12  6 - 20 mg/dL Final    Creatinine 09/10/2024 0.74  0.57 - 1.00 mg/dL Final    Sodium 09/10/2024 137  136 - 145 mmol/L Final    Potassium 09/10/2024 3.8  3.5 - 5.2 mmol/L Final    Chloride 09/10/2024 101  98 - 107 mmol/L Final    CO2 09/10/2024 26.3  22.0 - 29.0 mmol/L Final    Calcium 09/10/2024 8.9  8.6 - 10.5 mg/dL Final    Total Protein 09/10/2024 5.9 (L)  6.0 - 8.5 g/dL Final    Albumin 09/10/2024 3.8  3.5 - 5.2 g/dL Final    ALT (SGPT) 09/10/2024 22  1 - 33 U/L Final    AST (SGOT) 09/10/2024 14  1 - 32 U/L Final    Alkaline Phosphatase 09/10/2024 66  39 - 117 U/L Final    Total Bilirubin 09/10/2024 0.6  0.0 - 1.2 mg/dL Final    Globulin 09/10/2024 2.1  gm/dL Final    A/G Ratio 09/10/2024 1.8  g/dL Final    BUN/Creatinine Ratio 09/10/2024 16.2  7.0 - 25.0 Final    Anion Gap 09/10/2024 9.7  5.0 - 15.0 mmol/L Final    eGFR 09/10/2024 104.4  >60.0 mL/min/1.73 Final    Hemoglobin A1C 09/10/2024 5.20  4.80 - 5.60 % Final    Total Cholesterol 09/10/2024 203 (H)  0 - 200 mg/dL Final    Triglycerides 09/10/2024 190 (H)  0 - 150 mg/dL Final    HDL Cholesterol 09/10/2024 42  40 - 60 mg/dL Final    LDL Cholesterol  09/10/2024 127 (H)  0 - 100 mg/dL Final    VLDL  Cholesterol 09/10/2024 34  5 - 40 mg/dL Final    LDL/HDL Ratio 09/10/2024 2.93   Final    TSH 09/10/2024 0.817  0.270 - 4.200 uIU/mL Final    WBC 09/10/2024 10.24  3.40 - 10.80 10*3/mm3 Final    RBC 09/10/2024 4.88  3.77 - 5.28 10*6/mm3 Final    Hemoglobin 09/10/2024 15.8  12.0 - 15.9 g/dL Final    Hematocrit 09/10/2024 45.9  34.0 - 46.6 % Final    MCV 09/10/2024 94.1  79.0 - 97.0 fL Final    MCH 09/10/2024 32.4  26.6 - 33.0 pg Final    MCHC 09/10/2024 34.4  31.5 - 35.7 g/dL Final    RDW 09/10/2024 12.6  12.3 - 15.4 % Final    RDW-SD 09/10/2024 42.9  37.0 - 54.0 fl Final    MPV 09/10/2024 11.4  6.0 - 12.0 fL Final    Platelets 09/10/2024 238  140 - 450 10*3/mm3 Final    Neutrophil % 09/10/2024 64.0  42.7 - 76.0 % Final    Lymphocyte % 09/10/2024 25.0  19.6 - 45.3 % Final    Monocyte % 09/10/2024 8.1  5.0 - 12.0 % Final    Eosinophil % 09/10/2024 1.5  0.3 - 6.2 % Final    Basophil % 09/10/2024 0.5  0.0 - 1.5 % Final    Immature Grans % 09/10/2024 0.9 (H)  0.0 - 0.5 % Final    Neutrophils, Absolute 09/10/2024 6.56  1.70 - 7.00 10*3/mm3 Final    Lymphocytes, Absolute 09/10/2024 2.56  0.70 - 3.10 10*3/mm3 Final    Monocytes, Absolute 09/10/2024 0.83  0.10 - 0.90 10*3/mm3 Final    Eosinophils, Absolute 09/10/2024 0.15  0.00 - 0.40 10*3/mm3 Final    Basophils, Absolute 09/10/2024 0.05  0.00 - 0.20 10*3/mm3 Final    Immature Grans, Absolute 09/10/2024 0.09 (H)  0.00 - 0.05 10*3/mm3 Final    nRBC 09/10/2024 0.0  0.0 - 0.2 /100 WBC Final         Physical Exam  Vitals and nursing note reviewed.   Constitutional:       Appearance: Normal appearance. She is normal weight.   HENT:      Head: Normocephalic and atraumatic.   Cardiovascular:      Rate and Rhythm: Normal rate and regular rhythm.      Pulses: Normal pulses.      Heart sounds: Normal heart sounds. No murmur heard.     No friction rub. No gallop.   Pulmonary:      Effort: Pulmonary effort is normal. No respiratory distress.      Breath sounds: Normal breath sounds. No  stridor. No wheezing, rhonchi or rales.   Chest:      Chest wall: No tenderness.   Abdominal:      General: Abdomen is flat. Bowel sounds are normal. There is no distension.      Palpations: Abdomen is soft. There is no mass.      Tenderness: There is no abdominal tenderness. There is no right CVA tenderness, left CVA tenderness, guarding or rebound.      Hernia: No hernia is present.   Skin:     General: Skin is warm and dry.      Capillary Refill: Capillary refill takes less than 2 seconds.      Coloration: Skin is not jaundiced or pale.   Neurological:      General: No focal deficit present.      Mental Status: She is alert and oriented to person, place, and time. Mental status is at baseline.      Motor: No weakness.      Coordination: Coordination normal.      Gait: Gait normal.   Psychiatric:         Mood and Affect: Mood normal.         Behavior: Behavior normal.         Thought Content: Thought content normal.         Judgment: Judgment normal.          Physical Exam  Respiratory: Clear to auscultation, no wheezing, rales or rhonchi  Cardiovascular: Regular rate and rhythm, no murmurs, rubs, or gallops       Result Review  Data Reviewed:{ Labs  Result Review  Imaging  Med Tab  Media :23}   I have reviewed this patient's chart.  I have reviewed previous labs, previous imaging, previous medications, and previous encounters with notes that were available in this patient's chart.    Results                Assessment and Plan {CC Problem List  Visit Diagnosis  ROS  Review (Popup)  [facility]  Quality  BestPractice  Medications  SmartSets  SnapShot Encounters  Media :23}   Diagnoses and all orders for this visit:    1. Mixed hyperlipidemia (Primary)  -     Lipid Panel; Future    2. Essential hypertension  -     CBC & Differential; Future  -     Basic Metabolic Panel; Future  -     ECG 12 Lead    3. Screening for diabetes mellitus  -     Hemoglobin A1c; Future    4. Screening for thyroid  disorder  -     TSH; Future    5. Pre-op evaluation  -     ECG 12 Lead        Assessment & Plan  1. Preoperative clearance for bladder surgery.  - Blood pressure readings are within the normal range today at 139/57.  - Physical examination of heart and lungs is normal.  - She has been instructed to fast for 8 to 12 hours prior to the lab tests, with the exception of water and black coffee.  - A comprehensive metabolic panel (CMP), complete blood count (CBC), lipid profile, hemoglobin A1c, and thyroid-stimulating hormone (TSH) tests have been ordered.    2. Hypercholesterolemia.  - She has not been taking her cholesterol medication regularly.  - She has not had her cholesterol levels checked recently.  - A lipid profile has been ordered to assess her current cholesterol levels.  - She is advised to fast for 8 to 12 hours before the test.       -  -ER red flags discussed with patient including risk versus benefit and education provided.  -Follow-up with me 1 to 2 weeks after your surgery.    I spent 30 minutes caring for Dank on this date of service. This time includes time spent by me in the following activities:preparing for the visit, reviewing tests, obtaining and/or reviewing a separately obtained history, performing a medically appropriate examination and/or evaluation , counseling and educating the patient/family/caregiver, ordering medications, tests, or procedures, referring and communicating with other health care professionals , documenting information in the medical record, independently interpreting results and communicating that information with the patient/family/caregiver, and care coordination.    Follow Up {Instructions Charge Capture  Follow-up Communications :23}     Patient was given instructions and counseling regarding her condition or for health maintenance advice. Please see specific information pulled into the AVS (placed there by myself) if appropriate.    Return in about 6 weeks (around  7/1/2025).    BMI is >= 30 and <35. (Class 1 Obesity). The following options were offered after discussion;: exercise counseling/recommendations and nutrition counseling/recommendations         KEENA Mccarthy, [facility]-BC      Patient or patient representative verbalized consent for the use of Ambient Listening during the visit with  KEENA Mccarthy for chart documentation. 6/16/2025  12:09 EDT

## 2025-05-21 ENCOUNTER — LAB (OUTPATIENT)
Dept: LAB | Facility: HOSPITAL | Age: 45
End: 2025-05-21
Payer: COMMERCIAL

## 2025-05-21 DIAGNOSIS — I10 ESSENTIAL HYPERTENSION: ICD-10-CM

## 2025-05-21 DIAGNOSIS — Z13.29 SCREENING FOR THYROID DISORDER: ICD-10-CM

## 2025-05-21 DIAGNOSIS — Z13.1 SCREENING FOR DIABETES MELLITUS: ICD-10-CM

## 2025-05-21 DIAGNOSIS — E78.2 MIXED HYPERLIPIDEMIA: ICD-10-CM

## 2025-05-21 LAB
ANION GAP SERPL CALCULATED.3IONS-SCNC: 11.4 MMOL/L (ref 5–15)
ARTICHOKE IGE QN: 64 MG/DL (ref 0–100)
BASOPHILS # BLD AUTO: 0.04 10*3/MM3 (ref 0–0.2)
BASOPHILS NFR BLD AUTO: 0.5 % (ref 0–1.5)
BUN SERPL-MCNC: 23 MG/DL (ref 6–20)
BUN/CREAT SERPL: 14.6 (ref 7–25)
CALCIUM SPEC-SCNC: 8.8 MG/DL (ref 8.6–10.5)
CHLORIDE SERPL-SCNC: 106 MMOL/L (ref 98–107)
CHOLEST SERPL-MCNC: 220 MG/DL (ref 0–200)
CO2 SERPL-SCNC: 20.6 MMOL/L (ref 22–29)
CREAT SERPL-MCNC: 1.58 MG/DL (ref 0.57–1)
DEPRECATED RDW RBC AUTO: 44.5 FL (ref 37–54)
EGFRCR SERPLBLD CKD-EPI 2021: 41.7 ML/MIN/1.73
EOSINOPHIL # BLD AUTO: 0.15 10*3/MM3 (ref 0–0.4)
EOSINOPHIL NFR BLD AUTO: 1.9 % (ref 0.3–6.2)
ERYTHROCYTE [DISTWIDTH] IN BLOOD BY AUTOMATED COUNT: 13.1 % (ref 12.3–15.4)
GLUCOSE SERPL-MCNC: 81 MG/DL (ref 65–99)
HBA1C MFR BLD: 5.5 % (ref 4.8–5.6)
HCT VFR BLD AUTO: 44.4 % (ref 34–46.6)
HDLC SERPL-MCNC: 33 MG/DL (ref 40–60)
HGB BLD-MCNC: 15 G/DL (ref 12–15.9)
IMM GRANULOCYTES # BLD AUTO: 0.06 10*3/MM3 (ref 0–0.05)
IMM GRANULOCYTES NFR BLD AUTO: 0.8 % (ref 0–0.5)
LDLC SERPL CALC-MCNC: ABNORMAL MG/DL
LDLC/HDLC SERPL: ABNORMAL {RATIO}
LYMPHOCYTES # BLD AUTO: 2.88 10*3/MM3 (ref 0.7–3.1)
LYMPHOCYTES NFR BLD AUTO: 37.1 % (ref 19.6–45.3)
MCH RBC QN AUTO: 31.4 PG (ref 26.6–33)
MCHC RBC AUTO-ENTMCNC: 33.8 G/DL (ref 31.5–35.7)
MCV RBC AUTO: 93.1 FL (ref 79–97)
MONOCYTES # BLD AUTO: 0.71 10*3/MM3 (ref 0.1–0.9)
MONOCYTES NFR BLD AUTO: 9.1 % (ref 5–12)
NEUTROPHILS NFR BLD AUTO: 3.92 10*3/MM3 (ref 1.7–7)
NEUTROPHILS NFR BLD AUTO: 50.6 % (ref 42.7–76)
NRBC BLD AUTO-RTO: 0 /100 WBC (ref 0–0.2)
PLATELET # BLD AUTO: 244 10*3/MM3 (ref 140–450)
PMV BLD AUTO: 11.2 FL (ref 6–12)
POTASSIUM SERPL-SCNC: 4.1 MMOL/L (ref 3.5–5.2)
RBC # BLD AUTO: 4.77 10*6/MM3 (ref 3.77–5.28)
SODIUM SERPL-SCNC: 138 MMOL/L (ref 136–145)
TRIGL SERPL-MCNC: 1257 MG/DL (ref 0–150)
TSH SERPL DL<=0.05 MIU/L-ACNC: 1.5 UIU/ML (ref 0.27–4.2)
VLDLC SERPL-MCNC: ABNORMAL MG/DL
WBC NRBC COR # BLD AUTO: 7.76 10*3/MM3 (ref 3.4–10.8)

## 2025-05-21 PROCEDURE — 84443 ASSAY THYROID STIM HORMONE: CPT

## 2025-05-21 PROCEDURE — 83721 ASSAY OF BLOOD LIPOPROTEIN: CPT

## 2025-05-21 PROCEDURE — 83036 HEMOGLOBIN GLYCOSYLATED A1C: CPT

## 2025-05-21 PROCEDURE — 85025 COMPLETE CBC W/AUTO DIFF WBC: CPT

## 2025-05-21 PROCEDURE — 80048 BASIC METABOLIC PNL TOTAL CA: CPT

## 2025-05-21 PROCEDURE — 80061 LIPID PANEL: CPT

## 2025-05-21 PROCEDURE — 36415 COLL VENOUS BLD VENIPUNCTURE: CPT

## 2025-05-22 ENCOUNTER — TELEPHONE (OUTPATIENT)
Dept: FAMILY MEDICINE CLINIC | Facility: CLINIC | Age: 45
End: 2025-05-22
Payer: COMMERCIAL

## 2025-05-22 NOTE — TELEPHONE ENCOUNTER
Caller: Dank Cevallos    Relationship: Self    Best call back number: [phone]    What test was performed: LABS    When was the test performed: 5/21    Where was the test performed: [facility]    Additional notes:   PLEASE CALL TO DISCUSS RESULTS.

## 2025-05-22 NOTE — TELEPHONE ENCOUNTER
Advised patient that we have received results. Once Volodymyr reviews those we will call and send [employer] message.

## 2025-06-23 DIAGNOSIS — E78.2 MIXED HYPERLIPIDEMIA: ICD-10-CM

## 2025-06-23 DIAGNOSIS — K21.9 GASTROESOPHAGEAL REFLUX DISEASE, UNSPECIFIED WHETHER ESOPHAGITIS PRESENT: ICD-10-CM

## 2025-06-23 DIAGNOSIS — Z87.19 HISTORY OF HIATAL HERNIA: ICD-10-CM

## 2025-06-23 RX ORDER — OMEPRAZOLE 40 MG/1
40 CAPSULE, DELAYED RELEASE ORAL DAILY
Qty: 30 CAPSULE | Refills: 0 | Status: SHIPPED | OUTPATIENT
Start: 2025-06-23

## 2025-07-18 DIAGNOSIS — E78.2 MIXED HYPERLIPIDEMIA: ICD-10-CM

## 2025-07-18 DIAGNOSIS — K21.9 GASTROESOPHAGEAL REFLUX DISEASE, UNSPECIFIED WHETHER ESOPHAGITIS PRESENT: ICD-10-CM

## 2025-07-18 DIAGNOSIS — Z87.19 HISTORY OF HIATAL HERNIA: ICD-10-CM

## 2025-07-18 RX ORDER — OMEPRAZOLE 40 MG/1
40 CAPSULE, DELAYED RELEASE ORAL DAILY
Qty: 90 CAPSULE | Refills: 0 | Status: SHIPPED | OUTPATIENT
Start: 2025-07-18

## 2025-08-26 ENCOUNTER — CLINICAL SUPPORT (OUTPATIENT)
Dept: FAMILY MEDICINE CLINIC | Facility: CLINIC | Age: 45
End: 2025-08-26
Payer: COMMERCIAL

## 2025-08-26 DIAGNOSIS — Z13.29 SCREENING FOR ENDOCRINE, METABOLIC AND IMMUNITY DISORDER: ICD-10-CM

## 2025-08-26 DIAGNOSIS — Z13.0 SCREENING FOR ENDOCRINE, METABOLIC AND IMMUNITY DISORDER: ICD-10-CM

## 2025-08-26 DIAGNOSIS — E78.9 ABNORMAL CHOLESTEROL TEST: ICD-10-CM

## 2025-08-26 DIAGNOSIS — Z13.228 SCREENING FOR ENDOCRINE, METABOLIC AND IMMUNITY DISORDER: ICD-10-CM

## 2025-08-26 DIAGNOSIS — Z13.220 SCREENING FOR LIPID DISORDERS: ICD-10-CM

## 2025-08-26 LAB
ANION GAP SERPL CALCULATED.3IONS-SCNC: 8.9 MMOL/L (ref 5–15)
BUN SERPL-MCNC: 12.7 MG/DL (ref 6–20)
BUN/CREAT SERPL: 16.7 (ref 7–25)
CALCIUM SPEC-SCNC: 8.9 MG/DL (ref 8.6–10.5)
CHLORIDE SERPL-SCNC: 106 MMOL/L (ref 98–107)
CHOLEST SERPL-MCNC: 213 MG/DL (ref 0–200)
CO2 SERPL-SCNC: 24.1 MMOL/L (ref 22–29)
CREAT SERPL-MCNC: 0.76 MG/DL (ref 0.57–1)
EGFRCR SERPLBLD CKD-EPI 2021: 100.5 ML/MIN/1.73
GLUCOSE SERPL-MCNC: 106 MG/DL (ref 65–99)
HDLC SERPL-MCNC: 43 MG/DL (ref 40–60)
LDLC SERPL CALC-MCNC: 122 MG/DL (ref 0–100)
LDLC/HDLC SERPL: 2.67 {RATIO}
POTASSIUM SERPL-SCNC: 4 MMOL/L (ref 3.5–5.2)
SODIUM SERPL-SCNC: 139 MMOL/L (ref 136–145)
TRIGL SERPL-MCNC: 275 MG/DL (ref 0–150)
VLDLC SERPL-MCNC: 48 MG/DL (ref 5–40)

## 2025-08-26 PROCEDURE — 80061 LIPID PANEL: CPT | Performed by: REGISTERED NURSE

## 2025-08-26 PROCEDURE — 80048 BASIC METABOLIC PNL TOTAL CA: CPT | Performed by: REGISTERED NURSE

## 2025-08-29 ENCOUNTER — HOSPITAL ENCOUNTER (EMERGENCY)
Facility: HOSPITAL | Age: 45
Discharge: HOME | End: 2025-08-29
Attending: STUDENT IN AN ORGANIZED HEALTH CARE EDUCATION/TRAINING PROGRAM
Payer: COMMERCIAL

## 2025-08-29 ENCOUNTER — APPOINTMENT (OUTPATIENT)
Dept: CT IMAGING | Facility: HOSPITAL | Age: 45
End: 2025-08-29
Payer: COMMERCIAL

## 2025-08-29 VITALS
HEART RATE: 91 BPM | SYSTOLIC BLOOD PRESSURE: 165 MMHG | WEIGHT: 222.66 LBS | TEMPERATURE: 98.8 F | RESPIRATION RATE: 16 BRPM | DIASTOLIC BLOOD PRESSURE: 112 MMHG | BODY MASS INDEX: 32.98 KG/M2 | OXYGEN SATURATION: 98 % | HEIGHT: 69 IN

## 2025-08-29 DIAGNOSIS — R07.81 RIB PAIN: Primary | ICD-10-CM

## 2025-08-29 LAB
ALBUMIN SERPL-MCNC: 4 G/DL (ref 3.5–5.2)
ALBUMIN/GLOB SERPL: 1.6 G/DL
ALP SERPL-CCNC: 106 U/L (ref 39–117)
ALT SERPL W P-5'-P-CCNC: 24 U/L (ref 1–33)
ANION GAP SERPL CALCULATED.3IONS-SCNC: 9.8 MMOL/L (ref 5–15)
AST SERPL-CCNC: 13 U/L (ref 1–32)
BACTERIA UR QL AUTO: ABNORMAL /HPF
BASOPHILS # BLD AUTO: 0.03 10*3/MM3 (ref 0–0.2)
BASOPHILS NFR BLD AUTO: 0.3 % (ref 0–1.5)
BILIRUB SERPL-MCNC: 0.3 MG/DL (ref 0–1.2)
BILIRUB UR QL STRIP: NEGATIVE
BUN SERPL-MCNC: 14 MG/DL (ref 6–20)
BUN/CREAT SERPL: 16.3 (ref 7–25)
CALCIUM SPEC-SCNC: 9.3 MG/DL (ref 8.6–10.5)
CHLORIDE SERPL-SCNC: 106 MMOL/L (ref 98–107)
CLARITY UR: ABNORMAL
CO2 SERPL-SCNC: 22.2 MMOL/L (ref 22–29)
COLOR UR: ABNORMAL
CREAT SERPL-MCNC: 0.86 MG/DL (ref 0.57–1)
DEPRECATED RDW RBC AUTO: 38.4 FL (ref 37–54)
EGFRCR SERPLBLD CKD-EPI 2021: 86.6 ML/MIN/1.73
EOSINOPHIL # BLD AUTO: 0.13 10*3/MM3 (ref 0–0.4)
EOSINOPHIL NFR BLD AUTO: 1.3 % (ref 0.3–6.2)
ERYTHROCYTE [DISTWIDTH] IN BLOOD BY AUTOMATED COUNT: 11.7 % (ref 12.3–15.4)
GLOBULIN UR ELPH-MCNC: 2.5 GM/DL
GLUCOSE SERPL-MCNC: 94 MG/DL (ref 65–99)
GLUCOSE UR STRIP-MCNC: NEGATIVE MG/DL
HCT VFR BLD AUTO: 45.2 % (ref 34–46.6)
HGB BLD-MCNC: 15.7 G/DL (ref 12–15.9)
HGB UR QL STRIP.AUTO: ABNORMAL
HOLD SPECIMEN: NORMAL
HYALINE CASTS UR QL AUTO: ABNORMAL /LPF
IMM GRANULOCYTES # BLD AUTO: 0.06 10*3/MM3 (ref 0–0.05)
IMM GRANULOCYTES NFR BLD AUTO: 0.6 % (ref 0–0.5)
KETONES UR QL STRIP: NEGATIVE
LEUKOCYTE ESTERASE UR QL STRIP.AUTO: NEGATIVE
LIPASE SERPL-CCNC: 34 U/L (ref 13–60)
LYMPHOCYTES # BLD AUTO: 3.28 10*3/MM3 (ref 0.7–3.1)
LYMPHOCYTES NFR BLD AUTO: 32.6 % (ref 19.6–45.3)
MCH RBC QN AUTO: 31 PG (ref 26.6–33)
MCHC RBC AUTO-ENTMCNC: 34.7 G/DL (ref 31.5–35.7)
MCV RBC AUTO: 89.3 FL (ref 79–97)
MONOCYTES # BLD AUTO: 0.75 10*3/MM3 (ref 0.1–0.9)
MONOCYTES NFR BLD AUTO: 7.5 % (ref 5–12)
NEUTROPHILS NFR BLD AUTO: 5.81 10*3/MM3 (ref 1.7–7)
NEUTROPHILS NFR BLD AUTO: 57.7 % (ref 42.7–76)
NITRITE UR QL STRIP: NEGATIVE
PH UR STRIP.AUTO: 7 [PH] (ref 5–8)
PLATELET # BLD AUTO: 224 10*3/MM3 (ref 140–450)
PMV BLD AUTO: 10.6 FL (ref 6–12)
POTASSIUM SERPL-SCNC: 3.7 MMOL/L (ref 3.5–5.2)
PROT SERPL-MCNC: 6.5 G/DL (ref 6–8.5)
PROT UR QL STRIP: NEGATIVE
QT INTERVAL: 374 MS
QTC INTERVAL: 437 MS
RBC # BLD AUTO: 5.06 10*6/MM3 (ref 3.77–5.28)
RBC # UR STRIP: ABNORMAL /HPF
REF LAB TEST METHOD: ABNORMAL
SODIUM SERPL-SCNC: 138 MMOL/L (ref 136–145)
SP GR UR STRIP: 1.01 (ref 1–1.03)
SQUAMOUS #/AREA URNS HPF: ABNORMAL /HPF
TROPONIN T SERPL HS-MCNC: <6 NG/L
UROBILINOGEN UR QL STRIP: ABNORMAL
WBC # UR STRIP: ABNORMAL /HPF
WBC NRBC COR # BLD AUTO: 10.06 10*3/MM3 (ref 3.4–10.8)

## 2025-08-29 PROCEDURE — 25510000001 IOPAMIDOL PER 1 ML: Performed by: STUDENT IN AN ORGANIZED HEALTH CARE EDUCATION/TRAINING PROGRAM

## 2025-08-29 PROCEDURE — 25010000002 KETOROLAC TROMETHAMINE PER 15 MG: Performed by: STUDENT IN AN ORGANIZED HEALTH CARE EDUCATION/TRAINING PROGRAM

## 2025-08-29 PROCEDURE — 80053 COMPREHEN METABOLIC PANEL: CPT | Performed by: STUDENT IN AN ORGANIZED HEALTH CARE EDUCATION/TRAINING PROGRAM

## 2025-08-29 PROCEDURE — 85025 COMPLETE CBC W/AUTO DIFF WBC: CPT | Performed by: STUDENT IN AN ORGANIZED HEALTH CARE EDUCATION/TRAINING PROGRAM

## 2025-08-29 PROCEDURE — 74177 CT ABD & PELVIS W/CONTRAST: CPT

## 2025-08-29 PROCEDURE — 71275 CT ANGIOGRAPHY CHEST: CPT

## 2025-08-29 PROCEDURE — 93005 ELECTROCARDIOGRAM TRACING: CPT | Performed by: STUDENT IN AN ORGANIZED HEALTH CARE EDUCATION/TRAINING PROGRAM

## 2025-08-29 PROCEDURE — 99285 EMERGENCY DEPT VISIT HI MDM: CPT | Performed by: STUDENT IN AN ORGANIZED HEALTH CARE EDUCATION/TRAINING PROGRAM

## 2025-08-29 PROCEDURE — 81001 URINALYSIS AUTO W/SCOPE: CPT | Performed by: STUDENT IN AN ORGANIZED HEALTH CARE EDUCATION/TRAINING PROGRAM

## 2025-08-29 PROCEDURE — 83690 ASSAY OF LIPASE: CPT | Performed by: STUDENT IN AN ORGANIZED HEALTH CARE EDUCATION/TRAINING PROGRAM

## 2025-08-29 PROCEDURE — 84484 ASSAY OF TROPONIN QUANT: CPT | Performed by: STUDENT IN AN ORGANIZED HEALTH CARE EDUCATION/TRAINING PROGRAM

## 2025-08-29 RX ORDER — IOPAMIDOL 755 MG/ML
100 INJECTION, SOLUTION INTRAVASCULAR
Status: COMPLETED | OUTPATIENT
Start: 2025-08-29 | End: 2025-08-29

## 2025-08-29 RX ORDER — KETOROLAC TROMETHAMINE 15 MG/ML
15 INJECTION, SOLUTION INTRAMUSCULAR; INTRAVENOUS ONCE
Status: COMPLETED | OUTPATIENT
Start: 2025-08-29 | End: 2025-08-29

## 2025-08-29 RX ADMIN — KETOROLAC TROMETHAMINE 15 MG: 15 INJECTION, SOLUTION INTRAMUSCULAR; INTRAVENOUS at 17:54

## 2025-08-29 RX ADMIN — IOPAMIDOL 100 ML: 755 INJECTION, SOLUTION INTRAVENOUS at 18:20
